# Patient Record
Sex: MALE | Race: WHITE | NOT HISPANIC OR LATINO | Employment: OTHER | RURAL
[De-identification: names, ages, dates, MRNs, and addresses within clinical notes are randomized per-mention and may not be internally consistent; named-entity substitution may affect disease eponyms.]

---

## 2017-03-09 ENCOUNTER — OFFICE VISIT (OUTPATIENT)
Dept: FAMILY MEDICINE CLINIC | Facility: CLINIC | Age: 47
End: 2017-03-09

## 2017-03-09 VITALS
BODY MASS INDEX: 45.1 KG/M2 | WEIGHT: 315 LBS | DIASTOLIC BLOOD PRESSURE: 75 MMHG | SYSTOLIC BLOOD PRESSURE: 164 MMHG | OXYGEN SATURATION: 98 % | TEMPERATURE: 97.3 F | HEART RATE: 82 BPM | HEIGHT: 70 IN

## 2017-03-09 DIAGNOSIS — Z13.1 ENCOUNTER FOR SCREENING FOR DIABETES MELLITUS: ICD-10-CM

## 2017-03-09 DIAGNOSIS — Z13.29 ENCOUNTER FOR SCREENING FOR ENDOCRINE DISORDER: ICD-10-CM

## 2017-03-09 DIAGNOSIS — M54.2 CHRONIC NECK PAIN: ICD-10-CM

## 2017-03-09 DIAGNOSIS — G89.29 CHRONIC PAIN OF BOTH KNEES: Primary | ICD-10-CM

## 2017-03-09 DIAGNOSIS — Z00.00 GENERAL MEDICAL EXAMINATION: ICD-10-CM

## 2017-03-09 DIAGNOSIS — M25.562 CHRONIC PAIN OF BOTH KNEES: Primary | ICD-10-CM

## 2017-03-09 DIAGNOSIS — M25.561 CHRONIC PAIN OF BOTH KNEES: Primary | ICD-10-CM

## 2017-03-09 DIAGNOSIS — Z72.0 TOBACCO USER: ICD-10-CM

## 2017-03-09 DIAGNOSIS — Z71.6 TOBACCO ABUSE COUNSELING: ICD-10-CM

## 2017-03-09 DIAGNOSIS — G89.29 CHRONIC NECK PAIN: ICD-10-CM

## 2017-03-09 DIAGNOSIS — Z13.6 ENCOUNTER FOR SCREENING FOR CARDIOVASCULAR DISORDERS: ICD-10-CM

## 2017-03-09 DIAGNOSIS — E66.01 MORBID OBESITY, UNSPECIFIED OBESITY TYPE (HCC): ICD-10-CM

## 2017-03-09 PROCEDURE — 99203 OFFICE O/P NEW LOW 30 MIN: CPT | Performed by: FAMILY MEDICINE

## 2017-03-09 RX ORDER — HYDROCHLOROTHIAZIDE 25 MG/1
12.5 TABLET ORAL DAILY
Qty: 30 TABLET | Refills: 11 | Status: SHIPPED | OUTPATIENT
Start: 2017-03-09

## 2017-03-09 RX ORDER — IBUPROFEN 800 MG/1
800 TABLET ORAL EVERY 6 HOURS PRN
Qty: 120 TABLET | Refills: 3 | Status: SHIPPED | OUTPATIENT
Start: 2017-03-09

## 2017-03-09 RX ORDER — LISINOPRIL 10 MG/1
10 TABLET ORAL DAILY
Qty: 30 TABLET | Refills: 11 | Status: SHIPPED | OUTPATIENT
Start: 2017-03-09

## 2017-03-09 NOTE — PATIENT INSTRUCTIONS
Lisinopril tablets  What is this medicine?  LISINOPRIL (lyse IN oh pril) is an ACE inhibitor. This medicine is used to treat high blood pressure and heart failure. It is also used to protect the heart immediately after a heart attack.  This medicine may be used for other purposes; ask your health care provider or pharmacist if you have questions.  COMMON BRAND NAME(S): Prinivil, Zestril  What should I tell my health care provider before I take this medicine?  They need to know if you have any of these conditions:  -diabetes  -heart or blood vessel disease  -kidney disease  -low blood pressure  -previous swelling of the tongue, face, or lips with difficulty breathing, difficulty swallowing, hoarseness, or tightening of the throat  -an unusual or allergic reaction to lisinopril, other ACE inhibitors, insect venom, foods, dyes, or preservatives  -pregnant or trying to get pregnant  -breast-feeding  How should I use this medicine?  Take this medicine by mouth with a glass of water. Follow the directions on your prescription label. You may take this medicine with or without food. If it upsets your stomach, take it with food. Take your medicine at regular intervals. Do not take it more often than directed. Do not stop taking except on your doctor's advice.  Talk to your pediatrician regarding the use of this medicine in children. Special care may be needed. While this drug may be prescribed for children as young as 6 years of age for selected conditions, precautions do apply.  Overdosage: If you think you have taken too much of this medicine contact a poison control center or emergency room at once.  NOTE: This medicine is only for you. Do not share this medicine with others.  What if I miss a dose?  If you miss a dose, take it as soon as you can. If it is almost time for your next dose, take only that dose. Do not take double or extra doses.  What may interact with this medicine?  Do not take this medicine with any of  the following medications:  -hymenoptera venomThis medicines may also interact with the following medications:  -aliskiren  -angiotensin receptor blockers, like losartan or valsartan  -certain medicines for diabetes  -diuretics  -everolimus  -gold compounds  -lithium  -NSAIDs, medicines for pain and inflammation, like ibuprofen or naproxen  -potassium salts or supplements  -salt substitutes  -sirolimus  -temsirolimus  This list may not describe all possible interactions. Give your health care provider a list of all the medicines, herbs, non-prescription drugs, or dietary supplements you use. Also tell them if you smoke, drink alcohol, or use illegal drugs. Some items may interact with your medicine.  What should I watch for while using this medicine?  Visit your doctor or health care professional for regular check ups. Check your blood pressure as directed. Ask your doctor what your blood pressure should be, and when you should contact him or her.  Do not treat yourself for coughs, colds, or pain while you are using this medicine without asking your doctor or health care professional for advice. Some ingredients may increase your blood pressure.  Women should inform their doctor if they wish to become pregnant or think they might be pregnant. There is a potential for serious side effects to an unborn child. Talk to your health care professional or pharmacist for more information.  Check with your doctor or health care professional if you get an attack of severe diarrhea, nausea and vomiting, or if you sweat a lot. The loss of too much body fluid can make it dangerous for you to take this medicine.  You may get drowsy or dizzy. Do not drive, use machinery, or do anything that needs mental alertness until you know how this drug affects you. Do not stand or sit up quickly, especially if you are an older patient. This reduces the risk of dizzy or fainting spells. Alcohol can make you more drowsy and dizzy. Avoid  alcoholic drinks.  Avoid salt substitutes unless you are told otherwise by your doctor or health care professional.  What side effects may I notice from receiving this medicine?  Side effects that you should report to your doctor or health care professional as soon as possible:  -allergic reactions like skin rash, itching or hives, swelling of the hands, feet, face, lips, throat, or tongue  -breathing problems  -signs and symptoms of kidney injury like trouble passing urine or change in the amount of urine  -signs and symptoms of increased potassium like muscle weakness; chest pain; or fast, irregular heartbeat  -signs and symptoms of liver injury like dark yellow or brown urine; general ill feeling or flu-like symptoms; light-colored stools; loss of appetite; nausea; right upper belly pain; unusually weak or tired; yellowing of the eyes or skin  -signs and symptoms of low blood pressure like dizziness; feeling faint or lightheaded, falls; unusually weak or tired  -stomach pain with or without nausea and vomiting  Side effects that usually do not require medical attention (report to your doctor or health care professional if they continue or are bothersome):  -changes in taste  -cough  -dizziness  -fever  -headache  -sensitivity to light  This list may not describe all possible side effects. Call your doctor for medical advice about side effects. You may report side effects to FDA at 6-273-FDA-1686.  Where should I keep my medicine?  Keep out of the reach of children.  Store at room temperature between 15 and 30 degrees C (59 and 86 degrees F). Protect from moisture. Keep container tightly closed. Throw away any unused medicine after the expiration date.  NOTE: This sheet is a summary. It may not cover all possible information. If you have questions about this medicine, talk to your doctor, pharmacist, or health care provider.     © 2016, [employer] Standard. (2016-08-11 20:38:20)  Hydrochlorothiazide, HCTZ;  Lisinopril tablets  What is this medicine?  HYDROCHLOROTHIAZIDE; LISINOPRIL (anne droe klor oh THYE a zide; lyse IN oh pril) is a combination of a diuretic and an ACE inhibitor. It is used to treat high blood pressure.  This medicine may be used for other purposes; ask your health care provider or pharmacist if you have questions.  COMMON BRAND NAME(S): Prinzide, Zestoretic  What should I tell my health care provider before I take this medicine?  They need to know if you have any of these conditions:  -bone marrow disease  -decreased urine  -heart or blood vessel disease  -if you are on a special diet like a low salt diet  -immune system problems, like lupus  -kidney disease  -liver disease  -previous swelling of the tongue, face, or lips with difficulty breathing, difficulty swallowing, hoarseness, or tightening of the throat  -recent heart attack or stroke  -an unusual or allergic reaction to lisinopril, hydrochlorothiazide, sulfa drugs, other medicines, insect venom, foods, dyes, or preservatives  -pregnant or trying to get pregnant  -breast-feeding  How should I use this medicine?  Take this medicine by mouth with a glass of water. Follow the directions on the prescription label. You can take it with or without food. If it upsets your stomach, take it with food. Take your medicine at regular intervals. Do not take it more often than directed. Do not stop taking except on your doctor's advice.  Talk to your pediatrician regarding the use of this medicine in children. Special care may be needed.  Overdosage: If you think you have taken too much of this medicine contact a poison control center or emergency room at once.  NOTE: This medicine is only for you. Do not share this medicine with others.  What if I miss a dose?  If you miss a dose, take it as soon as you can. If it is almost time for your next dose, take only that dose. Do not take double or extra doses.  What may interact with this medicine?  -barbiturates  like phenobarbital  -blood pressure medicines  -corticosteroids like prednisone  -diabetic medications  -diuretics, especially triamterene, spironolactone or amiloride  -lithium  -NSAIDs like ibuprofen  -potassium salts or potassium supplements  -prescription pain medicines  -skeletal muscle relaxants like tubocurarine  -some cholesterol lowering medications like cholestyramine or colestipol  This list may not describe all possible interactions. Give your health care provider a list of all the medicines, herbs, non-prescription drugs, or dietary supplements you use. Also tell them if you smoke, drink alcohol, or use illegal drugs. Some items may interact with your medicine.  What should I watch for while using this medicine?  Visit your doctor or health care professional for regular checks on your progress. Check your blood pressure as directed. Ask your doctor or health care professional what your blood pressure should be and when you should contact him or her. Call your doctor or health care professional if you notice an irregular or fast heart beat.  You must not get dehydrated. Ask your doctor or health care professional how much fluid you need to drink a day. Check with him or her if you get an attack of severe diarrhea, nausea and vomiting, or if you sweat a lot. The loss of too much body fluid can make it dangerous for you to take this medicine.  Women should inform their doctor if they wish to become pregnant or think they might be pregnant. There is a potential for serious side effects to an unborn child. Talk to your health care professional or pharmacist for more information.  You may get drowsy or dizzy. Do not drive, use machinery, or do anything that needs mental alertness until you know how this drug affects you. Do not stand or sit up quickly, especially if you are an older patient. This reduces the risk of dizzy or fainting spells. Alcohol can make you more drowsy and dizzy. Avoid alcoholic  drinks.  This medicine may affect your blood sugar level. If you have diabetes, check with your doctor or health care professional before changing the dose of your diabetic medicine.  Avoid salt substitutes unless you are told otherwise by your doctor or health care professional.  This medicine can make you more sensitive to the sun. Keep out of the sun. If you cannot avoid being in the sun, wear protective clothing and use sunscreen. Do not use sun lamps or tanning beds/booths.  Do not treat yourself for coughs, colds, or pain while you are taking this medicine without asking your doctor or health care professional for advice. Some ingredients may increase your blood pressure.  What side effects may I notice from receiving this medicine?  Side effects that you should report to your doctor or health care professional as soon as possible:  -changes in vision  -confusion, dizziness, light headedness or fainting spells  -decreased amount of urine passed  -difficulty breathing or swallowing, hoarseness, or tightening of the throat  -eye pain  -fast or irregular heart beat, palpitations, or chest pain  -muscle cramps  -nausea and vomiting  -persistent dry cough  -redness, blistering, peeling or loosening of the skin, including inside the mouth  -stomach pain  -swelling of your face, lips, tongue, hands, or feet  -unusual rash, bleeding or bruising, or pinpoint red spots on the skin  -worsened gout pain  -yellowing of the eyes or skin  Side effects that usually do not require medical attention (report to your doctor or health care professional if they continue or are bothersome):  -change in sex drive or performance  -cough  -headache  This list may not describe all possible side effects. Call your doctor for medical advice about side effects. You may report side effects to FDA at 7-715-FDA-8048.  Where should I keep my medicine?  Keep out of the reach of children.  Store at room temperature between 20 and 25 degrees C  "(68 and 77 degrees F). Protect from moisture and excessive light. Keep container tightly closed. Throw away any unused medicine after the expiration date.  NOTE: This sheet is a summary. It may not cover all possible information. If you have questions about this medicine, talk to your doctor, pharmacist, or health care provider.     © 2016, [employer]vier/Gold Standard. (2011-09-07 13:33:52)  DASH Eating Plan  DASH stands for \"Dietary Approaches to Stop Hypertension.\" The DASH eating plan is a healthy eating plan that has been shown to reduce high blood pressure (hypertension). Additional health benefits may include reducing the risk of type 2 diabetes mellitus, heart disease, and stroke. The DASH eating plan may also help with weight loss.  WHAT DO I NEED TO KNOW ABOUT THE DASH EATING PLAN?  For the DASH eating plan, you will follow these general guidelines:  · Choose foods with a percent daily value for sodium of less than 5% (as listed on the food label).  · Use salt-free seasonings or herbs instead of table salt or sea salt.  · Check with your health care provider or pharmacist before using salt substitutes.  · Eat lower-sodium products, often labeled as \"lower sodium\" or \"no salt added.\"  · Eat fresh foods.  · Eat more vegetables, fruits, and low-fat dairy products.  · Choose whole grains. Look for the word \"whole\" as the first word in the ingredient list.  · Choose fish and skinless chicken or turkey more often than red meat. Limit fish, poultry, and meat to 6 oz (170 g) each day.  · Limit sweets, desserts, sugars, and sugary drinks.  · Choose heart-healthy fats.  · Limit cheese to 1 oz (28 g) per day.  · Eat more home-cooked food and less restaurant, buffet, and fast food.  · Limit fried foods.  · Cook foods using methods other than frying.  · Limit canned vegetables. If you do use them, rinse them well to decrease the sodium.  · When eating at a restaurant, ask that your food be prepared with less salt, or no salt " if possible.  WHAT FOODS CAN I EAT?  Seek help from a dietitian for individual calorie needs.  Grains  Whole grain or whole wheat bread. Brown rice. Whole grain or whole wheat pasta. Quinoa, bulgur, and whole grain cereals. Low-sodium cereals. Corn or whole wheat flour tortillas. Whole grain cornbread. Whole grain crackers. Low-sodium crackers.  Vegetables  Fresh or frozen vegetables (raw, steamed, roasted, or grilled). Low-sodium or reduced-sodium tomato and vegetable juices. Low-sodium or reduced-sodium tomato sauce and paste. Low-sodium or reduced-sodium canned vegetables.   Fruits  All fresh, canned (in natural juice), or frozen fruits.  Meat and Other Protein Products  Ground beef (85% or leaner), grass-fed beef, or beef trimmed of fat. Skinless chicken or turkey. Ground chicken or turkey. Pork trimmed of fat. All fish and seafood. Eggs. Dried beans, peas, or lentils. Unsalted nuts and seeds. Unsalted canned beans.  Dairy  Low-fat dairy products, such as skim or 1% milk, 2% or reduced-fat cheeses, low-fat ricotta or cottage cheese, or plain low-fat yogurt. Low-sodium or reduced-sodium cheeses.  Fats and Oils  Tub margarines without trans fats. Light or reduced-fat mayonnaise and salad dressings (reduced sodium). Avocado. Safflower, olive, or canola oils. Natural peanut or almond butter.  Other  Unsalted popcorn and pretzels.  The items listed above may not be a complete list of recommended foods or beverages. Contact your dietitian for more options.  WHAT FOODS ARE NOT RECOMMENDED?  Grains  White bread. White pasta. White rice. Refined cornbread. Bagels and croissants. Crackers that contain trans fat.  Vegetables  Creamed or fried vegetables. Vegetables in a cheese sauce. Regular canned vegetables. Regular canned tomato sauce and paste. Regular tomato and vegetable juices.  Fruits  Dried fruits. Canned fruit in light or heavy syrup. Fruit juice.  Meat and Other Protein Products  Fatty cuts of meat. Ribs,  chicken wings, aldridge, sausage, bologna, salami, chitterlings, fatback, hot dogs, bratwurst, and packaged luncheon meats. Salted nuts and seeds. Canned beans with salt.  Dairy  Whole or 2% milk, cream, half-and-half, and cream cheese. Whole-fat or sweetened yogurt. Full-fat cheeses or blue cheese. Nondairy creamers and whipped toppings. Processed cheese, cheese spreads, or cheese curds.  Condiments  Onion and garlic salt, seasoned salt, table salt, and sea salt. Canned and packaged gravies. Worcestershire sauce. Tartar sauce. Barbecue sauce. Teriyaki sauce. Soy sauce, including reduced sodium. Steak sauce. Fish sauce. Oyster sauce. Cocktail sauce. Horseradish. Ketchup and mustard. Meat flavorings and tenderizers. Bouillon cubes. Hot sauce. Tabasco sauce. Marinades. Taco seasonings. Relishes.  Fats and Oils  Butter, stick margarine, lard, shortening, ghee, and aldridge fat. Coconut, palm kernel, or palm oils. Regular salad dressings.  Other  Pickles and olives. Salted popcorn and pretzels.  The items listed above may not be a complete list of foods and beverages to avoid. Contact your dietitian for more information.  WHERE CAN I FIND MORE INFORMATION?  [employer] [place]: www.nhlbi.nih.gov/health/health-topics/topics/dash/     This information is not intended to replace advice given to you by your health care provider. Make sure you discuss any questions you have with your health care provider.     Document Released: 12/06/2012 Document Revised: 01/08/2016 Document Reviewed: 10/22/2014  Elsevier Interactive Patient Education ©2016 [employer]

## 2017-03-09 NOTE — PROGRESS NOTES
Subjective   Delmar Rodriges is a 46 y.o. male.     Problem List  1. Morbid obesity  2. Rheumatoid arthriits  3. Neck pain  4. Bilateral knee pain  5.  Tobacco smoker   6. Obstructive sleep apnea  7. Essential Hypertension    Pt is 45 yo WM who I am seeing for the first time. Is here to establish.  Recently moved back her from [place].  Records are not here currently.  Has history of RA and bilateral knee pain.  Was diagnosed with RA many years ago but was not seeing a Rheumatologist.  Also has seen Orthopedic Surgeon in [place] and I am told pt was not a candidate for surgery.  Pt is a former  and is currently on disability for his chronic pain issues.  Has history of morbid obesity and also smokes 1 ppd for many years. Wants to quit but is not willing to try nicotine patches.  Is also due for new labwork.  Also has been having neck pain for past 1 month now.  Denies any trauma but pt attributes pain to his previous job as . States Ibuprofen helps with pain in both knees and neck     Also BP is elevated today. Has history of hypertension and was on BP medication but not currently.  Denies any chest pain headaches or dizziness.  Does not have BP machine today       Neck Pain    This is a recurrent problem. The current episode started more than 1 month ago. The problem occurs daily. The problem has been unchanged. The pain is associated with nothing. The pain is present in the left side and right side. The quality of the pain is described as aching. The pain is at a severity of 5/10. The pain is moderate. The pain is same all the time. Stiffness is present all day. Associated symptoms include numbness and tingling. Pertinent negatives include no chest pain, fever, headaches, leg pain, pain with swallowing, paresis, photophobia, syncope, trouble swallowing, visual change, weakness or weight loss. He has tried NSAIDs for the symptoms. The treatment provided mild relief.   Knee Pain    The incident  "occurred more than 1 week ago. The incident occurred at home. There was no injury mechanism. The pain is present in the left knee and right knee. The quality of the pain is described as aching. The pain is at a severity of 7/10. The pain is moderate. Associated symptoms include an inability to bear weight, a loss of motion, a loss of sensation, muscle weakness, numbness and tingling. He reports no foreign bodies present. Nothing aggravates the symptoms. He has tried NSAIDs for the symptoms. The treatment provided mild relief.          The following portions of the patient's history were reviewed and updated as appropriate: allergies, current medications, past family history, past medical history, past social history, past surgical history and problem list.    Review of Systems   Constitutional: Negative for fever and weight loss.   HENT: Negative for trouble swallowing.    Eyes: Negative for photophobia.   Cardiovascular: Negative for chest pain and syncope.   Musculoskeletal: Positive for neck pain.   Neurological: Positive for tingling and numbness. Negative for weakness and headaches.       Objective    Visit Vitals   • /75 (BP Location: Left arm, Patient Position: Sitting, Cuff Size: Adult)   • Pulse 82   • Temp 97.3 °F (36.3 °C) (Axillary)   • Ht 70\" (177.8 cm)   • Wt (!) 369 lb 9.6 oz (168 kg)   • SpO2 98%   • BMI 53.03 kg/m2       Physical Exam    Assessment/Plan   Problems Addressed this Visit        Digestive    Morbid obesity    Relevant Orders    CBC Auto Differential    Comprehensive Metabolic Panel    Lipid Panel       Nervous and Auditory    Chronic neck pain    Relevant Orders    XR Spine Cervical Complete 4 or 5 View       Other    Chronic pain of both knees - Primary    Relevant Orders    XR Knee 3 View Bilateral    General medical examination    Relevant Orders    TSH    Hemoglobin A1c    Encounter for screening for diabetes mellitus    Encounter for screening for endocrine disorder    " Tobacco user    Encounter for screening for cardiovascular disorders    Tobacco abuse counseling      - for tobacco abuse counseling -recommended nicotine patch - patient declined. Counseled pt >5 minutes  - chronic pain in both knees-  Ibuprofen 800 mg PO q 6hrs PRN - bilateral knee x-ray.  Possible referral to Orthopedic surgery  - moribid obesity - possible candidate for lap band surgery.  Will get labs including insulin, TSH.  Counseled to limit fast food intake.  Recommend high healthy fat low sugar and low carb diet  - recheck in 1 month  - discuss immunizations on next visit  - also discuss about possible PT/OT  -  Chronic neck pain - ibuprofen 800 mg q 6hrs PRN.  Cervical neck x-ray

## 2017-03-10 LAB
ALBUMIN SERPL-MCNC: 4.4 G/DL (ref 3.4–4.8)
ALBUMIN/GLOB SERPL: 1.3 G/DL (ref 1.1–1.8)
ALP SERPL-CCNC: 74 U/L (ref 38–126)
ALT SERPL W P-5'-P-CCNC: 41 U/L (ref 21–72)
ANION GAP SERPL CALCULATED.3IONS-SCNC: 11 MMOL/L (ref 5–15)
ARTICHOKE IGE QN: 100 MG/DL (ref 1–129)
AST SERPL-CCNC: 26 U/L (ref 17–59)
BASOPHILS # BLD AUTO: 0.03 10*3/MM3 (ref 0–0.2)
BASOPHILS NFR BLD AUTO: 0.4 % (ref 0–2)
BILIRUB SERPL-MCNC: 0.9 MG/DL (ref 0.2–1.3)
BUN BLD-MCNC: 19 MG/DL (ref 7–21)
BUN/CREAT SERPL: 25.3 (ref 7–25)
CALCIUM SPEC-SCNC: 9.5 MG/DL (ref 8.4–10.2)
CHLORIDE SERPL-SCNC: 102 MMOL/L (ref 95–110)
CHOLEST SERPL-MCNC: 180 MG/DL (ref 0–199)
CO2 SERPL-SCNC: 26 MMOL/L (ref 22–31)
CREAT BLD-MCNC: 0.75 MG/DL (ref 0.7–1.3)
DEPRECATED RDW RBC AUTO: 48.3 FL (ref 35.1–43.9)
EOSINOPHIL # BLD AUTO: 0.29 10*3/MM3 (ref 0–0.7)
EOSINOPHIL NFR BLD AUTO: 3.5 % (ref 0–7)
ERYTHROCYTE [DISTWIDTH] IN BLOOD BY AUTOMATED COUNT: 14.5 % (ref 11.5–14.5)
GFR SERPL CREATININE-BSD FRML MDRD: 112 ML/MIN/1.73 (ref 63–147)
GLOBULIN UR ELPH-MCNC: 3.3 GM/DL (ref 2.3–3.5)
GLUCOSE BLD-MCNC: 92 MG/DL (ref 60–100)
HBA1C MFR BLD: 5.2 % (ref 4–5.6)
HCT VFR BLD AUTO: 46.6 % (ref 39–49)
HDLC SERPL-MCNC: 28 MG/DL (ref 60–200)
HGB BLD-MCNC: 15.9 G/DL (ref 13.7–17.3)
IMM GRANULOCYTES # BLD: 0.05 10*3/MM3 (ref 0–0.02)
IMM GRANULOCYTES NFR BLD: 0.6 % (ref 0–0.5)
LDLC/HDLC SERPL: 3.59 {RATIO} (ref 0–3.55)
LYMPHOCYTES # BLD AUTO: 2.23 10*3/MM3 (ref 0.6–4.2)
LYMPHOCYTES NFR BLD AUTO: 27.3 % (ref 10–50)
MCH RBC QN AUTO: 30.8 PG (ref 26.5–34)
MCHC RBC AUTO-ENTMCNC: 34.1 G/DL (ref 31.5–36.3)
MCV RBC AUTO: 90.1 FL (ref 80–98)
MONOCYTES # BLD AUTO: 0.78 10*3/MM3 (ref 0–0.9)
MONOCYTES NFR BLD AUTO: 9.5 % (ref 0–12)
NEUTROPHILS # BLD AUTO: 4.79 10*3/MM3 (ref 2–8.6)
NEUTROPHILS NFR BLD AUTO: 58.7 % (ref 37–80)
PLAT MORPH BLD: NORMAL
PLATELET # BLD AUTO: 223 10*3/MM3 (ref 150–450)
PMV BLD AUTO: 11.5 FL (ref 8–12)
POTASSIUM BLD-SCNC: 4.8 MMOL/L (ref 3.5–5.1)
PROT SERPL-MCNC: 7.7 G/DL (ref 6.3–8.6)
RBC # BLD AUTO: 5.17 10*6/MM3 (ref 4.37–5.74)
RBC MORPH BLD: NORMAL
SODIUM BLD-SCNC: 139 MMOL/L (ref 137–145)
TRIGL SERPL-MCNC: 258 MG/DL (ref 20–199)
TSH SERPL DL<=0.05 MIU/L-ACNC: 1.8 MIU/ML (ref 0.46–4.68)
WBC MORPH BLD: NORMAL
WBC NRBC COR # BLD: 8.17 10*3/MM3 (ref 3.2–9.8)

## 2017-03-10 PROCEDURE — 83036 HEMOGLOBIN GLYCOSYLATED A1C: CPT | Performed by: FAMILY MEDICINE

## 2017-03-10 PROCEDURE — 85025 COMPLETE CBC W/AUTO DIFF WBC: CPT | Performed by: FAMILY MEDICINE

## 2017-03-10 PROCEDURE — 80061 LIPID PANEL: CPT | Performed by: FAMILY MEDICINE

## 2017-03-10 PROCEDURE — 84443 ASSAY THYROID STIM HORMONE: CPT | Performed by: FAMILY MEDICINE

## 2017-03-10 PROCEDURE — 80053 COMPREHEN METABOLIC PANEL: CPT | Performed by: FAMILY MEDICINE

## 2017-03-10 PROCEDURE — 85007 BL SMEAR W/DIFF WBC COUNT: CPT | Performed by: FAMILY MEDICINE

## 2017-04-10 ENCOUNTER — OFFICE VISIT (OUTPATIENT)
Dept: FAMILY MEDICINE CLINIC | Facility: CLINIC | Age: 47
End: 2017-04-10

## 2017-04-10 VITALS
WEIGHT: 315 LBS | RESPIRATION RATE: 16 BRPM | DIASTOLIC BLOOD PRESSURE: 72 MMHG | HEIGHT: 70 IN | TEMPERATURE: 98.6 F | BODY MASS INDEX: 45.1 KG/M2 | SYSTOLIC BLOOD PRESSURE: 120 MMHG | HEART RATE: 64 BPM

## 2017-04-10 DIAGNOSIS — Z23 NEED FOR VACCINATION: ICD-10-CM

## 2017-04-10 DIAGNOSIS — M25.562 CHRONIC PAIN OF BOTH KNEES: ICD-10-CM

## 2017-04-10 DIAGNOSIS — G89.29 CHRONIC NECK PAIN: Primary | ICD-10-CM

## 2017-04-10 DIAGNOSIS — E78.5 HYPERLIPIDEMIA, UNSPECIFIED HYPERLIPIDEMIA TYPE: ICD-10-CM

## 2017-04-10 DIAGNOSIS — M25.561 CHRONIC PAIN OF BOTH KNEES: ICD-10-CM

## 2017-04-10 DIAGNOSIS — M54.2 CHRONIC NECK PAIN: Primary | ICD-10-CM

## 2017-04-10 DIAGNOSIS — I10 ESSENTIAL HYPERTENSION: ICD-10-CM

## 2017-04-10 DIAGNOSIS — G89.29 CHRONIC PAIN OF BOTH KNEES: ICD-10-CM

## 2017-04-10 DIAGNOSIS — E66.01 MORBID OBESITY, UNSPECIFIED OBESITY TYPE (HCC): ICD-10-CM

## 2017-04-10 LAB
CRP SERPL-MCNC: 1.3 MG/DL (ref 0–1)
ERYTHROCYTE [SEDIMENTATION RATE] IN BLOOD: 25 MM/HR (ref 0–15)
URATE SERPL-MCNC: 7.9 MG/DL (ref 2.5–8.5)

## 2017-04-10 PROCEDURE — 84550 ASSAY OF BLOOD/URIC ACID: CPT | Performed by: FAMILY MEDICINE

## 2017-04-10 PROCEDURE — 86431 RHEUMATOID FACTOR QUANT: CPT | Performed by: FAMILY MEDICINE

## 2017-04-10 PROCEDURE — 85651 RBC SED RATE NONAUTOMATED: CPT | Performed by: FAMILY MEDICINE

## 2017-04-10 PROCEDURE — G0009 ADMIN PNEUMOCOCCAL VACCINE: HCPCS | Performed by: FAMILY MEDICINE

## 2017-04-10 PROCEDURE — 90472 IMMUNIZATION ADMIN EACH ADD: CPT | Performed by: FAMILY MEDICINE

## 2017-04-10 PROCEDURE — 86200 CCP ANTIBODY: CPT | Performed by: FAMILY MEDICINE

## 2017-04-10 PROCEDURE — 86060 ANTISTREPTOLYSIN O TITER: CPT | Performed by: FAMILY MEDICINE

## 2017-04-10 PROCEDURE — 90732 PPSV23 VACC 2 YRS+ SUBQ/IM: CPT | Performed by: FAMILY MEDICINE

## 2017-04-10 PROCEDURE — 90715 TDAP VACCINE 7 YRS/> IM: CPT | Performed by: FAMILY MEDICINE

## 2017-04-10 PROCEDURE — 99214 OFFICE O/P EST MOD 30 MIN: CPT | Performed by: FAMILY MEDICINE

## 2017-04-10 PROCEDURE — 86038 ANTINUCLEAR ANTIBODIES: CPT | Performed by: FAMILY MEDICINE

## 2017-04-10 PROCEDURE — 86140 C-REACTIVE PROTEIN: CPT | Performed by: FAMILY MEDICINE

## 2017-04-10 PROCEDURE — 86063 ANTISTREPTOLYSIN O SCREEN: CPT | Performed by: FAMILY MEDICINE

## 2017-04-10 NOTE — PATIENT INSTRUCTIONS

## 2017-04-10 NOTE — PROGRESS NOTES
Subjective   Delmar Rodriges is a 46 y.o. male.     Problem List  1. Morbid obesity  2. Rheumatoid arthriits  3. Neck pain  4. Bilateral knee pain  5. Former Tobacco smoker   6. Obstructive sleep apnea  7. Essential Hypertension  8. Hyperlipidemia    Pt is 45 yo WM with the above medical issues. He is here for recheck. Pt cannot afford medications at this time due to lack of insurance. States his insurance won't be active until after May 1st. Pt cannot afford medications for blood pressure and for hyperlipidemia. Will get paychecks after May 1st   ASCVD risk elevated and statin medication is indicated.  Pt has quit smoking 3 weeks ago. Hga1c and TSH were normal.  Pt also has history of RA and bilateral knee pain along with neck pain but records are not currently here from previous PCP.  Pt's knees continue to be swollen and tender.  Did not get x-rays of neck and knees from last visit.  Pt states he has not seen Rheumatologist in past and was told he did have RA      Joint Swelling   This is a chronic problem. The current episode started more than 1 year ago. The problem occurs daily. The problem has been unchanged. Associated symptoms include arthralgias, joint swelling and numbness. Pertinent negatives include no abdominal pain, anorexia, change in bowel habit, chest pain, chills, congestion, coughing, fatigue, fever, headaches, myalgias, nausea, neck pain, rash, sore throat, swollen glands, urinary symptoms, vertigo, visual change, vomiting or weakness. The symptoms are aggravated by standing. He has tried nothing for the symptoms. The treatment provided no relief.   Knee Pain    The incident occurred more than 1 week ago. The incident occurred at home. The pain is present in the left knee and right knee. The quality of the pain is described as aching. The pain is at a severity of 7/10. The pain is moderate. The pain has been fluctuating since onset. Associated symptoms include a loss of motion, a loss of sensation,  "muscle weakness, numbness and tingling. Pertinent negatives include no inability to bear weight. He reports no foreign bodies present. The symptoms are aggravated by movement, palpation and weight bearing. The treatment provided no relief.          The following portions of the patient's history were reviewed and updated as appropriate: allergies, current medications, past family history, past medical history, past social history, past surgical history and problem list.    Review of Systems   Constitutional: Negative.  Negative for chills, fatigue and fever.   HENT: Negative.  Negative for congestion and sore throat.    Eyes: Negative.    Respiratory: Negative.  Negative for cough.    Cardiovascular: Negative.  Negative for chest pain.   Gastrointestinal: Negative.  Negative for abdominal pain, anorexia, change in bowel habit, nausea and vomiting.   Endocrine: Negative.    Genitourinary: Negative.    Musculoskeletal: Positive for arthralgias and joint swelling. Negative for myalgias and neck pain.   Skin: Negative.  Negative for rash.   Allergic/Immunologic: Negative.    Neurological: Positive for tingling and numbness. Negative for vertigo, weakness and headaches.   Hematological: Negative.    Psychiatric/Behavioral: Negative.        Objective    /72  Pulse 64  Temp 98.6 °F (37 °C)  Resp 16  Ht 70\" (177.8 cm)  Wt (!) 375 lb 6.4 oz (170 kg)  BMI 53.86 kg/m2      Chemistry        Component Value Date/Time     03/10/2017 1021    K 4.8 03/10/2017 1021     03/10/2017 1021    CO2 26.0 03/10/2017 1021    BUN 19 03/10/2017 1021    CREATININE 0.75 03/10/2017 1021        Component Value Date/Time    CALCIUM 9.5 03/10/2017 1021    ALKPHOS 74 03/10/2017 1021    AST 26 03/10/2017 1021    ALT 41 03/10/2017 1021    BILITOT 0.9 03/10/2017 1021        Lab Results   Component Value Date    WBC 8.17 03/10/2017    HGB 15.9 03/10/2017    HCT 46.6 03/10/2017    MCV 90.1 03/10/2017     03/10/2017     Lab " Results   Component Value Date    CHOL 180 03/10/2017     Lab Results   Component Value Date    TRIG 258 (H) 03/10/2017     Lab Results   Component Value Date    HDL 28 (L) 03/10/2017     No results found for: LDLCALC  No results found for: LDL  No results found for: HDLLDLRATIO  No components found for: CHOLHDL  Lab Results   Component Value Date    HGBA1C 5.20 03/10/2017     Lab Results   Component Value Date    TSH 1.800 03/10/2017     Physical Exam   Constitutional: He is oriented to person, place, and time. He appears well-developed and well-nourished. No distress.   HENT:   Head: Normocephalic and atraumatic.   Right Ear: External ear normal.   Left Ear: External ear normal.   Eyes: Conjunctivae and EOM are normal. Pupils are equal, round, and reactive to light. Right eye exhibits no discharge. Left eye exhibits no discharge. No scleral icterus.   Neck: Normal range of motion. Neck supple. No JVD present. No tracheal deviation present. No thyromegaly present.   Cardiovascular: Normal rate, regular rhythm and normal heart sounds.    Pulmonary/Chest: Effort normal and breath sounds normal. No stridor. No respiratory distress. He has no wheezes.   Abdominal: Soft. Bowel sounds are normal. He exhibits no distension and no mass. There is no tenderness. There is no rebound and no guarding. No hernia.   Obese abdomen    Musculoskeletal: He exhibits tenderness. He exhibits no edema or deformity.        Right knee: He exhibits decreased range of motion, swelling and effusion. He exhibits no erythema. Tenderness found. Medial joint line and lateral joint line tenderness noted.        Left knee: He exhibits decreased range of motion, swelling and effusion. He exhibits no erythema. Tenderness found. Medial joint line tenderness noted.   Lymphadenopathy:     He has no cervical adenopathy.   Neurological: He is alert and oriented to person, place, and time. He has normal reflexes. No cranial nerve deficit. Coordination  normal.   Skin: Skin is warm and dry. No rash noted. He is not diaphoretic. No erythema. No pallor.   Psychiatric: He has a normal mood and affect. His behavior is normal. Judgment and thought content normal.   Nursing note and vitals reviewed.      Assessment/Plan   Problems Addressed this Visit        Cardiovascular and Mediastinum    Hyperlipidemia    Essential hypertension       Digestive    Morbid obesity       Nervous and Auditory    Chronic neck pain - Primary    Relevant Orders    XR Spine Cervical Complete 4 or 5 View       Other    Chronic pain of both knees    Relevant Orders    Uric acid    Antistreptolysin O screen    Rheumatoid factor    C-reactive protein    ROSALES    Sedimentation Rate    Cyclic Citrul Peptide Antibody, IgG / IgA    XR knee 4+ vw bilateral    Need for vaccination    Relevant Orders    Tdap Vaccine Greater Than or Equal To 6yo IM (Completed)    Pneumococcal Polysaccharide Vaccine 23-Valent Greater Than or Equal To 3yo Subcutaneous / IM (Completed)        - Pt will get x-rays of both knees since he missed it on last visit.  Will get rheumatoid panel, along with ESR and CRP. RF and Anti - CCP. Consider Rheumatology referral  - Since pt has hyperlipidemia and ASCVD risk high. Pt cannot afford medication for hypertension and hyperlipidemia at this time. Will wait until insurance is active in May  - will get pneumonia and tetanus vaccination today  - recheck in 1 month   - counseled on diet and exercise.  Gave educational material on DASH diet to go home with. Consider bariatric surgery if pt is willing to do that   - will reorder bilateral knee x-ray as well as x-ray of cervical neck

## 2017-04-11 LAB
ANA SER QL: NEGATIVE
CCP IGA+IGG SERPL IA-ACNC: 8 UNITS (ref 0–19)

## 2017-04-14 LAB
ASO AB SERPL-ACNC: POSITIVE [IU]/ML
ASO AB TITR SER: ABNORMAL {TITER}
RHEUMATOID FACT SERPL-ACNC: NEGATIVE [IU]/ML

## 2017-04-19 ENCOUNTER — OFFICE VISIT (OUTPATIENT)
Dept: FAMILY MEDICINE CLINIC | Facility: CLINIC | Age: 47
End: 2017-04-19

## 2017-04-19 VITALS
SYSTOLIC BLOOD PRESSURE: 118 MMHG | HEIGHT: 70 IN | HEART RATE: 63 BPM | TEMPERATURE: 98.6 F | BODY MASS INDEX: 45.1 KG/M2 | RESPIRATION RATE: 16 BRPM | DIASTOLIC BLOOD PRESSURE: 66 MMHG | WEIGHT: 315 LBS

## 2017-04-19 DIAGNOSIS — M47.812 ARTHRITIS OF NECK: ICD-10-CM

## 2017-04-19 DIAGNOSIS — M17.0 PRIMARY OSTEOARTHRITIS OF BOTH KNEES: ICD-10-CM

## 2017-04-19 DIAGNOSIS — E66.01 MORBID OBESITY, UNSPECIFIED OBESITY TYPE (HCC): Primary | ICD-10-CM

## 2017-04-19 PROCEDURE — 99213 OFFICE O/P EST LOW 20 MIN: CPT | Performed by: FAMILY MEDICINE

## 2017-04-19 NOTE — PROGRESS NOTES
Subjective   Delmar Rodriges is a 46 y.o. male.     History of Present Illness     Problem List  1. Morbid obesity  2. Osteoarthritis of both knees.  Severe osteoarthritic changes of the left knee with moderate osteoarthritic changes in right knee.   3. Neck pain  4. Bilateral knee pain  5. Former Tobacco smoker   6. Obstructive sleep apnea  7. Essential Hypertension  8. Hyperlipidemia  9. Extensive arthritis changes of apophyseal joints at multiple levels of cervical spine     Pt is 47 yo WM with the above medical issues who is here for followup on x-rays and labwork.  Pt had rheumatoid workup and was negative for ROSALES and had low Anti-CCP.  RF was negative. Pt was told in past he has RA.  ESR and CRP are mildly elevated. Recent x-rays of knees showed severe osteoarthritic changes of the left knee with moderate osteoarthritic changes in right knee.  X-ray of spine showed extensive arthritis change of apophyseal joints at multiple levels of cervical spine.  Pt states pain in knees are severe but is awaiting for insurance to be activated in May. Has seen Orthopedic surgeon before in [place] and was told surgery was not recommended due to his age.  Pt would like to hold off seeing Orthopedic Surgeon for now or doing PT/OT     The following portions of the patient's history were reviewed and updated as appropriate: allergies, current medications, past family history, past medical history, past social history, past surgical history and problem list.    Review of Systems   Constitutional: Negative.    HENT: Negative.    Eyes: Negative.    Respiratory: Negative.    Cardiovascular: Negative.    Gastrointestinal: Negative.    Endocrine: Negative.    Genitourinary: Negative.    Musculoskeletal: Positive for arthralgias, back pain, gait problem, neck pain and neck stiffness.   Skin: Negative.    Allergic/Immunologic: Negative.    Hematological: Negative.    Psychiatric/Behavioral: Negative.        Objective    /66  Pulse 63  " Temp 98.6 °F (37 °C)  Resp 16  Ht 70\" (177.8 cm)  Wt (!) 375 lb 6.4 oz (170 kg)  BMI 53.86 kg/m2    Chemistry        Component Value Date/Time     03/10/2017 1021    K 4.8 03/10/2017 1021     03/10/2017 1021    CO2 26.0 03/10/2017 1021    BUN 19 03/10/2017 1021    CREATININE 0.75 03/10/2017 1021        Component Value Date/Time    CALCIUM 9.5 03/10/2017 1021    ALKPHOS 74 03/10/2017 1021    AST 26 03/10/2017 1021    ALT 41 03/10/2017 1021    BILITOT 0.9 03/10/2017 1021        Lab Results   Component Value Date    WBC 8.17 03/10/2017    HGB 15.9 03/10/2017    HCT 46.6 03/10/2017    MCV 90.1 03/10/2017     03/10/2017     Lab Results   Component Value Date    CHOL 180 03/10/2017     Lab Results   Component Value Date    TRIG 258 (H) 03/10/2017     Lab Results   Component Value Date    HDL 28 (L) 03/10/2017     No results found for: LDLCALC  No results found for: LDL  No results found for: HDLLDLRATIO  No components found for: CHOLHDL  Lab Results   Component Value Date    HGBA1C 5.20 03/10/2017     Lab Results   Component Value Date    TSH 1.800 03/10/2017     Physical Exam   Constitutional: He is oriented to person, place, and time. He appears well-developed and well-nourished. No distress.   HENT:   Head: Normocephalic and atraumatic.   Right Ear: External ear normal.   Left Ear: External ear normal.   Eyes: Conjunctivae and EOM are normal. Pupils are equal, round, and reactive to light. Right eye exhibits no discharge. Left eye exhibits no discharge. No scleral icterus.   Neck: Normal range of motion. Neck supple. No JVD present. No tracheal deviation present. No thyromegaly present.   Cardiovascular: Normal rate, regular rhythm and normal heart sounds.    Pulmonary/Chest: Effort normal and breath sounds normal. No stridor. No respiratory distress. He has no wheezes.   Abdominal: Soft. Bowel sounds are normal. He exhibits no distension and no mass. There is no tenderness. There is no rebound " and no guarding. No hernia.   Obese abdomen    Musculoskeletal: He exhibits tenderness. He exhibits no edema or deformity.        Right knee: He exhibits decreased range of motion and swelling. Tenderness found. Medial joint line and lateral joint line tenderness noted.        Left knee: He exhibits decreased range of motion and swelling. Tenderness found. Medial joint line and lateral joint line tenderness noted.   Lymphadenopathy:     He has no cervical adenopathy.   Neurological: He is alert and oriented to person, place, and time. He has normal reflexes. No cranial nerve deficit. Coordination normal.   Skin: Skin is warm and dry. No rash noted. He is not diaphoretic. No erythema. No pallor.   Psychiatric: He has a normal mood and affect. His behavior is normal. Judgment and thought content normal.   Nursing note and vitals reviewed.      Assessment/Plan   Problems Addressed this Visit        Digestive    Morbid obesity - Primary       Musculoskeletal and Integument    Arthritis of neck       Other    Primary osteoarthritis of both knees        - pt declines referral to Orthopedic Surgeon at this time.  Also declines PT/OT. Once insurance is activity pt will let me know and will make referral.    - will recheck in 3 months  - pt declines dietician for morbid obesity. Counseled on diet and exercise today.

## 2017-07-19 ENCOUNTER — OFFICE VISIT (OUTPATIENT)
Dept: FAMILY MEDICINE CLINIC | Facility: CLINIC | Age: 47
End: 2017-07-19

## 2017-07-19 VITALS
DIASTOLIC BLOOD PRESSURE: 66 MMHG | TEMPERATURE: 98.6 F | HEART RATE: 67 BPM | HEIGHT: 70 IN | BODY MASS INDEX: 45.1 KG/M2 | SYSTOLIC BLOOD PRESSURE: 130 MMHG | RESPIRATION RATE: 16 BRPM | WEIGHT: 315 LBS

## 2017-07-19 DIAGNOSIS — E78.5 HYPERLIPIDEMIA, UNSPECIFIED HYPERLIPIDEMIA TYPE: Primary | ICD-10-CM

## 2017-07-19 DIAGNOSIS — E66.01 MORBID OBESITY, UNSPECIFIED OBESITY TYPE (HCC): ICD-10-CM

## 2017-07-19 DIAGNOSIS — Z00.00 GENERAL MEDICAL EXAMINATION: ICD-10-CM

## 2017-07-19 DIAGNOSIS — I10 ESSENTIAL HYPERTENSION: ICD-10-CM

## 2017-07-19 PROCEDURE — 99213 OFFICE O/P EST LOW 20 MIN: CPT | Performed by: FAMILY MEDICINE

## 2017-07-19 NOTE — PROGRESS NOTES
"Subjective   Delmar Rodriges is a 46 y.o. male.     History of Present Illness     Problem List  1. Morbid obesity  2. Osteoarthritis of both knees.  Severe osteoarthritic changes of the left knee with moderate osteoarthritic changes in right knee.   3. Neck pain  4. Bilateral knee pain  5. Former Tobacco smoker   6. Obstructive sleep apnea  7. Essential Hypertension  8. Hyperlipidemia  9. Extensive arthritis changes of apophyseal joints at multiple levels of cervical spine     Pt is 47 yo WM with the above medical issues. Is here for recheck. States he is doing well. Has lost 10 lbs since last visit by diet and exercise.  Is due for recheck on lipid panel for hyperlipidemia along with CBC and MP.  Pt has quit smoking as well.    BMI >40. But pt has been eating healthier and working out more      The following portions of the patient's history were reviewed and updated as appropriate: allergies, current medications, past family history, past medical history, past social history, past surgical history and problem list.    Review of Systems   Constitutional: Negative.    HENT: Negative.    Eyes: Negative.    Respiratory: Negative.    Cardiovascular: Negative.    Gastrointestinal: Negative.    Endocrine: Negative.    Genitourinary: Negative.    Musculoskeletal: Positive for arthralgias.   Skin: Negative.    Allergic/Immunologic: Negative.    Neurological: Negative.    Hematological: Negative.    Psychiatric/Behavioral: Negative.        Objective    /66  Pulse 67  Temp 98.6 °F (37 °C)  Resp 16  Ht 70\" (177.8 cm)  Wt (!) 367 lb 12.8 oz (167 kg)  BMI 52.77 kg/m2      Chemistry        Component Value Date/Time     03/10/2017 1021    K 4.8 03/10/2017 1021     03/10/2017 1021    CO2 26.0 03/10/2017 1021    BUN 19 03/10/2017 1021    CREATININE 0.75 03/10/2017 1021        Component Value Date/Time    CALCIUM 9.5 03/10/2017 1021    ALKPHOS 74 03/10/2017 1021    AST 26 03/10/2017 1021    ALT 41 03/10/2017 " 1021    BILITOT 0.9 03/10/2017 1021        Lab Results   Component Value Date    WBC 8.17 03/10/2017    HGB 15.9 03/10/2017    HCT 46.6 03/10/2017    MCV 90.1 03/10/2017     03/10/2017     Lab Results   Component Value Date    CHOL 180 03/10/2017     Lab Results   Component Value Date    TRIG 258 (H) 03/10/2017     Lab Results   Component Value Date    HDL 28 (L) 03/10/2017     No results found for: LDLCALC  No results found for: LDL  No results found for: HDLLDLRATIO  No components found for: CHOLHDL  Lab Results   Component Value Date    HGBA1C 5.20 03/10/2017     Office Visit on 04/10/2017   Component Date Value Ref Range Status   • Uric Acid 04/10/2017 7.9  2.5 - 8.5 mg/dL Final   • ASO 04/10/2017 Positive* Negative Final   • Rheumatoid Factor Qualitative 04/10/2017 Negative  Negative Final   • C-Reactive Protein 04/10/2017 1.30* 0.00 - 1.00 mg/dL Final   • ROSALES Direct 04/10/2017 Negative  Negative Final   • Sed Rate 04/10/2017 25* 0 - 15 mm/hr Final   • CCP Antibodies IgG/IgA 04/10/2017 8  0 - 19 units Final                              Negative               <20                            Weak positive      20 - 39                            Moderate positive  40 - 59                            Strong positive        >59   • ASO Titer 04/10/2017 200 IU/mL* Negative Final       Physical Exam   Constitutional: He is oriented to person, place, and time. He appears well-developed and well-nourished. No distress.   HENT:   Head: Normocephalic and atraumatic.   Right Ear: External ear normal.   Left Ear: External ear normal.   Eyes: Conjunctivae and EOM are normal. Pupils are equal, round, and reactive to light. Right eye exhibits no discharge. Left eye exhibits no discharge. No scleral icterus.   Neck: Normal range of motion. Neck supple. No JVD present. No tracheal deviation present. No thyromegaly present.   Cardiovascular: Normal rate, regular rhythm and normal heart sounds.    Pulmonary/Chest: Effort  normal and breath sounds normal. No stridor. No respiratory distress. He has no wheezes.   Abdominal: Soft. Bowel sounds are normal. He exhibits no distension and no mass. There is no tenderness. There is no rebound and no guarding. No hernia.   Obese abdomen   Musculoskeletal: Normal range of motion. He exhibits no edema, tenderness or deformity.   Lymphadenopathy:     He has no cervical adenopathy.   Neurological: He is alert and oriented to person, place, and time. He has normal reflexes. No cranial nerve deficit. Coordination normal.   Skin: Skin is warm and dry. No rash noted. He is not diaphoretic. No erythema. No pallor.   Psychiatric: He has a normal mood and affect. His behavior is normal.   Nursing note and vitals reviewed.      Assessment/Plan   Problems Addressed this Visit        Cardiovascular and Mediastinum    Hyperlipidemia - Primary    Relevant Orders    Lipid Panel    Essential hypertension       Digestive    Morbid obesity       Other    General medical examination    Relevant Orders    CBC Auto Differential    Comprehensive Metabolic Panel        - Hyperlipidemia - recheck lipid panel  - Essential hypertension - BP at goal continue HCTZ  - morbid obesity - BMI >40. Continue with weight loss, diet and exercise  - CBC and CMP today  - recheck in 3 months   - gave prescription for CPAP machine and supplies today

## 2017-08-10 ENCOUNTER — OFFICE VISIT (OUTPATIENT)
Dept: FAMILY MEDICINE CLINIC | Facility: CLINIC | Age: 47
End: 2017-08-10

## 2017-08-10 VITALS
HEART RATE: 72 BPM | RESPIRATION RATE: 20 BRPM | BODY MASS INDEX: 45.1 KG/M2 | OXYGEN SATURATION: 97 % | WEIGHT: 315 LBS | DIASTOLIC BLOOD PRESSURE: 100 MMHG | SYSTOLIC BLOOD PRESSURE: 140 MMHG | HEIGHT: 70 IN | TEMPERATURE: 98.2 F

## 2017-08-10 DIAGNOSIS — Z00.00 MEDICARE ANNUAL WELLNESS VISIT, INITIAL: Primary | ICD-10-CM

## 2017-08-10 PROCEDURE — G0438 PPPS, INITIAL VISIT: HCPCS | Performed by: FAMILY MEDICINE

## 2017-08-10 NOTE — PROGRESS NOTES
QUICK REFERENCE INFORMATION:  The ABCs of the Annual Wellness Visit    Initial Medicare Wellness Visit    HEALTH RISK ASSESSMENT    1970    Recent Hospitalizations:  No hospitalization(s) within the last year..        Current Medical Providers:  Patient Care Team:  Abdullahi Paula MD as PCP - General (Family Medicine)        Smoking Status:  History   Smoking Status   • Current Some Day Smoker   Smokeless Tobacco   • Never Used       Alcohol Consumption:  History   Alcohol Use No       Depression Screen:   PHQ-9 Depression Screening 8/10/2017   Little interest or pleasure in doing things 1   Feeling down, depressed, or hopeless 3   Trouble falling or staying asleep, or sleeping too much 0   Feeling tired or having little energy 3   Poor appetite or overeating 0   Feeling bad about yourself - or that you are a failure or have let yourself or your family down 1   Trouble concentrating on things, such as reading the newspaper or watching television 0   Moving or speaking so slowly that other people could have noticed. Or the opposite - being so fidgety or restless that you have been moving around a lot more than usual 0   Thoughts that you would be better off dead, or of hurting yourself in some way 0   PHQ-9 Total Score 8   If you checked off any problems, how difficult have these problems made it for you to do your work, take care of things at home, or get along with other people? Not difficult at all       Health Habits and Functional and Cognitive Screening:  Functional & Cognitive Status 8/10/2017   Do you have difficulty preparing food and eating? No   Do you have difficulty bathing yourself? No   Do you have difficulty getting dressed? No   Do you have difficulty using the toilet? No   Do you have difficulty moving around from place to place? No   In the past year have you fallen or experienced a near fall? No   Do you need help using the phone?  No   Are you deaf or do you have serious difficulty hearing?   No   Do you need help with transportation? No   Do you need help shopping? No   Do you need help preparing meals?  No   Do you need help with housework?  No   Do you need help with laundry? No   Do you need help taking your medications? No   Do you need help managing money? No       Health Habits  Dental Exam: Not up to date  Eye Exam: Not up to date  Exercise (times per week): 6 times per week  Current Exercise Activities Include: Stationary Bicycling/Spin Class          Does the patient have evidence of cognitive impairment? No    Asiprin use counseling: Start ASA 81 mg daily       Recent Lab Results:    Visual Acuity:  No exam data present    Age-appropriate Screening Schedule:  Refer to the list below for future screening recommendations based on patient's age, sex and/or medical conditions. Orders for these recommended tests are listed in the plan section. The patient has been provided with a written plan.    Health Maintenance   Topic Date Due   • INFLUENZA VACCINE  09/01/2017   • LIPID PANEL  03/10/2018   • TDAP/TD VACCINES (2 - Td) 04/10/2027   • PNEUMOCOCCAL VACCINE (19-64 MEDIUM RISK)  Completed        Subjective   History of Present Illness    Delmar Rodriges is a 46 y.o. male who presents for an Annual Wellness Visit.    The following portions of the patient's history were reviewed and updated as appropriate: allergies, current medications, past family history, past medical history, past social history, past surgical history and problem list.    Outpatient Medications Prior to Visit   Medication Sig Dispense Refill   • aspirin 81 MG tablet Take 1 tablet by mouth Daily. 30 tablet 11   • hydrochlorothiazide (HYDRODIURIL) 25 MG tablet Take 0.5 tablets by mouth Daily. 30 tablet 11   • ibuprofen (ADVIL,MOTRIN) 800 MG tablet Take 1 tablet by mouth Every 6 (Six) Hours As Needed for Mild Pain (1-3). 120 tablet 3   • lisinopril (PRINIVIL,ZESTRIL) 10 MG tablet Take 1 tablet by mouth Daily. 30 tablet 11     No  "facility-administered medications prior to visit.        Patient Active Problem List   Diagnosis   • Chronic pain of both knees   • Chronic neck pain   • General medical examination   • Morbid obesity   • Encounter for screening for diabetes mellitus   • Encounter for screening for endocrine disorder   • Tobacco user   • Encounter for screening for cardiovascular disorders   • Tobacco abuse counseling   • Hyperlipidemia   • Essential hypertension   • Need for vaccination   • Primary osteoarthritis of both knees   • Arthritis of neck       Advance Care Planning:  has NO advance directive - information provided to the patient today    Identification of Risk Factors:  Risk factors include: weight .    Review of Systems    Compared to one year ago, the patient feels his physical health is the same.  Compared to one year ago, the patient feels his mental health is the same.    Objective     Physical Exam   Cardiovascular: Normal rate and regular rhythm.    Pulmonary/Chest: Effort normal and breath sounds normal.       Vitals:    08/10/17 0936   BP: 140/100   BP Location: Right arm   Patient Position: Sitting   Cuff Size: Large Adult   Pulse: 72   Resp: 20   Temp: 98.2 °F (36.8 °C)   TempSrc: Oral   SpO2: 97%   Weight: (!) 356 lb 6.4 oz (162 kg)   Height: 70\" (177.8 cm)   PainSc: 0-No pain       Body mass index is 51.14 kg/(m^2).  Discussed the patient's BMI with him. The BMI is above average; BMI management plan is completed.    Assessment/Plan   Patient Self-Management and Personalized Health Advice  The patient has been provided with information about: diet and preventive services including:   · Exercise counseling provided.    Visit Diagnoses:  No diagnosis found.    No orders of the defined types were placed in this encounter.      Outpatient Encounter Prescriptions as of 8/10/2017   Medication Sig Dispense Refill   • aspirin 81 MG tablet Take 1 tablet by mouth Daily. 30 tablet 11   • hydrochlorothiazide " (HYDRODIURIL) 25 MG tablet Take 0.5 tablets by mouth Daily. 30 tablet 11   • ibuprofen (ADVIL,MOTRIN) 800 MG tablet Take 1 tablet by mouth Every 6 (Six) Hours As Needed for Mild Pain (1-3). 120 tablet 3   • lisinopril (PRINIVIL,ZESTRIL) 10 MG tablet Take 1 tablet by mouth Daily. 30 tablet 11     No facility-administered encounter medications on file as of 8/10/2017.        Reviewed use of high risk medication in the elderly: yes  Reviewed for potential of harmful drug interactions in the elderly: yes    Follow Up:  No Follow-up on file.     Action Plan discussed.  Please see Scanned Documents    An After Visit Summary and PPPS with all of these plans were given to the patient.           This document has been electronically signed by Abdullahi Paula MD on August 10, 2017 10:19 AM

## 2021-09-20 ENCOUNTER — TRANSCRIBE ORDERS (OUTPATIENT)
Dept: PHYSICAL THERAPY | Facility: HOSPITAL | Age: 51
End: 2021-09-20

## 2021-09-20 DIAGNOSIS — M25.562 LEFT KNEE PAIN, UNSPECIFIED CHRONICITY: Primary | ICD-10-CM

## 2021-09-21 ENCOUNTER — HOSPITAL ENCOUNTER (OUTPATIENT)
Dept: PHYSICAL THERAPY | Facility: HOSPITAL | Age: 51
Setting detail: THERAPIES SERIES
Discharge: HOME | End: 2021-09-21

## 2021-09-21 DIAGNOSIS — M25.562 LEFT KNEE PAIN, UNSPECIFIED CHRONICITY: Primary | ICD-10-CM

## 2021-09-21 PROCEDURE — 97161 PT EVAL LOW COMPLEX 20 MIN: CPT | Performed by: PHYSICAL THERAPIST

## 2021-09-21 PROCEDURE — 97110 THERAPEUTIC EXERCISES: CPT | Performed by: PHYSICAL THERAPIST

## 2021-09-21 NOTE — THERAPY EVALUATION
"    Outpatient Physical Therapy Ortho Initial Evaluation  [facility]     Patient Name: Delmar Rodriges  : 1970  MRN: [id number]  Today's Date: 2021      Visit Date: 2021     Attendance: 1 visit  Subjective improvement: N/A  MD appt: 2021  Recheck due: 10/12/2021      Patient Active Problem List   Diagnosis   • Chronic pain of both knees   • Chronic neck pain   • General medical examination   • Morbid obesity (CMS/HCC)   • Encounter for screening for diabetes mellitus   • Encounter for screening for endocrine disorder   • Tobacco user   • Encounter for screening for cardiovascular disorders   • Tobacco abuse counseling   • Hyperlipidemia   • Essential hypertension   • Need for vaccination   • Primary osteoarthritis of both knees   • Arthritis of neck        Past Medical History:   Diagnosis Date   • Arthritis    • Essential hypertension 4/10/2017   • Hyperlipidemia 4/10/2017   • Low back pain    • Obesity    • Urinary tract infection    • Visual impairment     wears glasses        History reviewed. No pertinent surgical history.    Visit Dx:     ICD-10-CM ICD-9-CM   1. 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 Pt then worked as a  until -. Couldn't do his job because of his knee pain. Now on disability. C/o medial >lateral knee pain but states it hurts all over and radiates down into his lower leg. Has significant OA. MD states he needs to lose some weight before talks of surgery. Wears knee brace, which he says helps some with swelling & support. Without it, the pain is \"excruciating\".  " States his knee is stiff and won't extend very well. Feels unstable and pops a lot. Has a cane he got from his sister but doesn't know if he's using it right. Doesn't seem like it helps. Lives in single level home; 3 steps to enter, no railings - has a wall he can hold to. Has tub shower that's difficult to get in sometimes.  -KG      Patient/Caregiver Goals  Relieve pain;Improve mobility;Know what to do to help the symptoms  -KG      Occupation/sports/leisure activities  On disability; reports he's not very active due his knee pain  -KG      What clinical tests have you had for this problem?  X-ray  -KG      Results of Clinical Tests  Had L knee radiograph this month but don't have results. imaging from 2017 shows Severe osteoarthritic changes of left knee, severe loss of medial knee joint compartment height with bone opposing bone  -KG         Pain     Pain Location  Knee  -KG      Pain at Present  4  -KG      Pain at Best  4  -KG      Pain at Worst  10  -KG      Pain Frequency  Constant/continuous  -KG      Pain Description  Aching;Burning;Radiating;Sharp;Tightness  -KG      What Performance Factors Make the Current Problem(s) WORSE?  Pain with walking; weightbearing. Sitting for long periods. ADL's painful sometimes  -KG      What Performance Factors Make the Current Problem(s) BETTER?  Rest, wearing knee brace  -KG      Is your sleep disturbed?  Yes  -KG      Is medication used to assist with sleep?  Unspecified  -KG      Difficulties at work?  N/A  -KG      Difficulties with ADL's?  Independent  -KG      Difficulties with recreational activities?  N/A  -KG         Fall Risk Assessment    Any falls in the past year:  No  -KG        User Key  (r) = Recorded By, (t) = Taken By, (c) = Cosigned By    Initials Name Provider Type    KG Tori Witt PT Physical Therapist          PT Ortho     Row Name 09/21/21 1500       Subjective Comments    Subjective Comments  Refer to therapy pt hx for details  -KG        Precautions and Contraindications    Precautions/Limitations  no known precautions/limitations  -KG       Posture/Observations    Posture/Observations Comments  Poor overall postural awareness. Presents wearing L velcro knee brace with hinges. Notable decreased TKE on L with gait, static stance, & with supine positioning.  -KG       Special Tests/Palpation    Special Tests/Palpation  Knee  -KG       Knee Palpation    Knee Palpation?  Yes  -KG    Medial Joint Line  Left:;Tender  -KG    Lateral Joint Line  Left:;Tender  -KG    ITB  Left:;Tender  -KG    Quads  Left:;Tender  -KG    Medial Gastroc Head  Left:;Guarded/taut  -KG    Lateral Gastroc Head  Left:;Guarded/taut  -KG       Patellar Accessory Motions    Patellar Accessory Motions Tested?  Yes  -KG    Superior glide  Left:;Hypomobile;Left pain  -KG    Inferior glide  Left:;Hypomobile;Left pain  -KG    Medial glide  Left:;Hypomobile  -KG    Lateral glide  Left:;Hypomobile  -KG       Knee Special Tests    Anterior drawer (ACL lesion)  Bilateral:;Negative  -KG    Posterior drawer (PCL lesion)  Bilateral:;Negative  -KG       General ROM    RT Lower Ext  Rt Knee Extension/Flexion  -KG    LT Lower Ext  Lt Knee Extension/Flexion  -KG    GENERAL ROM COMMENTS  Bilateral hip and ankle AROM WFL  -KG       Right Lower Ext    Rt Knee Extension/Flexion AROM  2-120 deg  -KG       Left Lower Ext    Lt Knee Extension/Flexion AROM   deg  -KG    LT Lower Extremity Comments  Pain at end ranges  -KG       MMT (Manual Muscle Testing)    Rt Lower Ext  Rt Hip Flexion;Rt Hip ABduction;Rt Hip ADduction;Rt Knee Extension;Rt Knee Flexion  -KG    Lt Lower Ext  Lt Hip Flexion;Lt Hip ABduction;Lt Hip ADduction;Lt Knee Extension;Lt Knee Flexion  -KG       MMT Right Lower Ext    Rt Hip Flexion MMT, Gross Movement  (5/5) normal  -KG    Rt Hip ABduction MMT, Gross Movement  (4+/5) good plus  -KG    Rt Hip ADduction MMT, Gross Movement  (5/5) normal  -KG    Rt Knee Extension MMT, Gross Movement   (5/5) normal  -KG    Rt Knee Flexion MMT, Gross Movement  (5/5) normal  -KG       MMT Left Lower Ext    Lt Hip Flexion MMT, Gross Movement  (4+/5) good plus  -KG    Lt Hip ABduction MMT, Gross Movement  (4/5) good  -KG    Lt Hip ADduction MMT, Gross Movement  (4+/5) good plus  -KG    Lt Knee Extension MMT, Gross Movement  (4/5) good  -KG    Lt Knee Flexion MMT, Gross Movement  (4/5) good  -KG    Lt Lower Extremity Comments   No lag with SLR within available range of ext; fatigues easily  -KG       Sensation    Sensation WNL?  WNL  -KG    Light Touch  No apparent deficits  -KG       Girth    Girth Measured?  Right Lower Extremity;Left Lower Extremity  -KG       RLE Quick Girth (cm)    Mid patella  54 cm  -KG       LLE Quick Girth (cm)    Mid patella  52 cm  -KG       Gait/Stairs (Locomotion)    Comment (Gait/Stairs)  Ambulates with no AD entering clinic. Significant decreased stance time and TKE during stance. Step to pattern. Slow val. Did gait train wtih SPC; gait mechanics improved but still with same deficits just not as significant. Less pain reported with gait with SPC.  -KG      User Key  (r) = Recorded By, (t) = Taken By, (c) = Cosigned By    Initials Name Provider Type    KG Tori Witt, PT Physical Therapist                      Therapy Education  Education Details: HEP: see exercise flowsheet for details; gait training with SPC  Given: HEP, Symptoms/condition management, Posture/body mechanics, Pain management, Mobility training  Program: New  How Provided: Verbal, Demonstration, Written  Provided to: Patient  Level of Understanding: Teach back education performed, Verbalized, Demonstrated     PT OP Goals     Row Name 09/21/21 1500          PT Short Term Goals    STG Date to Achieve  10/11/21  -KG     STG 1  Demonstrate independence/compliance in performance of initial HEP  -KG     STG 2  Demonstrate improved L knee flex AROM to 115 deg or greater  -KG     STG 3  Demonstrate improved L knee ext  AROM to 18 deg or less  -KG     STG 4  Perform active L SLR flex 2x10 with good eccentric control, no lag or compensation  -KG     STG 5  Demonstrate improved L heel strike and stance time with ambulating throughout treatment with use of SPC  -KG        Long Term Goals    LTG Date to Achieve  11/01/21  -KG     LTG 1  Improve LEFS score to 25/80 or greater  -KG     LTG 2  Demonstrate improved L knee flex/ext MMT to 5/5  -KG     LTG 3  Demonstrate improved L hip abd MMT to 5/5  -KG     LTG 4  Demonstrate ability to ascend/descend training steps with use of SPC, step to pattern as needed  -KG     LTG 5  Tolerate 15 minutes of standing therex/stabilization activities with increased in pain </= 4/10  -KG        Time Calculation    PT Goal Re-Cert Due Date  10/11/21  -KG       User Key  (r) = Recorded By, (t) = Taken By, (c) = Cosigned By    Initials Name Provider Type    KG Tori Witt, PT Physical Therapist          PT Assessment/Plan     Row Name 09/21/21 1500          PT Assessment    Functional Limitations  Decreased safety during functional activities;Impaired gait;Limitation in home management;Limitations in community activities;Performance in leisure activities;Performance in self-care ADL  -KG     Impairments  Balance;Gait;Impaired flexibility;Joint mobility;Muscle strength;Pain;Range of motion  -KG     Assessment Comments  Pt is a 50 y.o.male presenting with chronic L knee pain due to significant OA which is limiting performance of functional mobility and daily activities. Pt quite limited in L knee ext which is especially noted with gait. Pt with poor gait kinematics without an AD; did gait train and educate on SPC use, which pt did better and was more safe with gait. Notable deficits in quad strength & stability present as well. Minimal patella mobility on L. Pt did fair with therex activities for initial HEP adminstration. Surgery pending for future date, per MD pt needs to work on weight loss. Pt will  "benefit from skilled PT at this time to help improve functional knee ROM/mobility & knee/quad strength & stability to help improve functional mobility and prepare pt for future surgery.  -KG     Please refer to paper survey for additional self-reported information  Yes  -KG     Rehab Potential  Fair Chronicity of condition, significant arthritic changes  -KG     Patient/caregiver participated in establishment of treatment plan and goals  Yes  -KG     Patient would benefit from skilled therapy intervention  Yes  -KG        PT Plan    PT Frequency  2x/week  -KG     Predicted Duration of Therapy Intervention (PT)  6 weeks  -KG     Physical Therapy Interventions (Optional Details)  gait training;manual therapy techniques;modalities;neuromuscular re-education;patient/family education;ROM (Range of Motion);strengthening;stretching  -KG     PT Plan Comments  Focus on improving knee ROM and overall mobility. Work on functional knee strengthening & stability activities. Continue working on and encouraging cane use with gait. Progress towards standing activities as tolerated.  -KG       User Key  (r) = Recorded By, (t) = Taken By, (c) = Cosigned By    Initials Name Provider Type    KG Tori Witt, PT Physical Therapist            OP Exercises     Row Name 09/21/21 1500             Precautions    Existing Precautions/Restrictions  no known precautions/restrictions  -KG         Subjective Comments    Subjective Comments  Refer to therapy pt hx for details  -KG         Subjective Pain    Able to rate subjective pain?  yes  -KG      Pre-Treatment Pain Level  4  -KG      Post-Treatment Pain Level  4  -KG         Exercise 1    Exercise Name 1  Gastroc stretch with strap  -KG      Reps 1  2  -KG      Time 1  30\" hold  -KG         Exercise 2    Exercise Name 2  Quad set with towel roll under knee  -KG      Sets 2  1  -KG      Reps 2  10  -KG      Time 2  5\" hold  -KG         Exercise 3    Exercise Name 3  Attempted SAQ but " "difficult due to limited knee ext  -KG         Exercise 4    Exercise Name 4  Semi-recumbent SLR flex  -KG      Sets 4  1  -KG      Reps 4  10  -KG         Exercise 5    Exercise Name 5  Heel prop ext stretch  -KG      Reps 5  Review for HEP  -KG         Exercise 6    Exercise Name 6  AA heels slides with strap  -KG      Sets 6  1  -KG      Reps 6  8  -KG         Exercise 7    Exercise Name 7  Seated heel slides  -KG      Reps 7  Review for HEP  -KG         Exercise 8    Exercise Name 8  LAQ to tolerance  -KG      Sets 8  1  -KG      Reps 8  10  -KG      Time 8  3-5\" hold  -KG         Exercise 9    Exercise Name 9  Gait training with SPC to improve stance time, step length, and safety  -KG        User Key  (r) = Recorded By, (t) = Taken By, (c) = Cosigned By    Initials Name Provider Type    Tori Danielle, PT Physical Therapist                        Outcome Measure Options: Lower Extremity Functional Scale (LEFS)  Lower Extremity Functional Index  Any of your usual work, housework or school activities: Quite a bit of difficulty  Your usual hobbies, recreational or sporting activities: Extreme difficulty or unable to perform activity  Getting into or out of the bath: Moderate difficulty  Walking between rooms: Moderate difficulty  Putting on your shoes or socks: A little bit of difficulty  Squatting: Extreme difficulty or unable to perform activity  Lifting an object, like a bag of groceries from the floor: Quite a bit of difficulty  Performing light activities around your home: Quite a bit of difficulty  Performing heavy activities around your home: Extreme difficulty or unable to perform activity  Getting into or out of a car: Quite a bit of difficulty  Walking 2 blocks: Extreme difficulty or unable to perform activity  Walking a mile: Extreme difficulty or unable to perform activity  Going up or down 10 stairs (about 1 flight of stairs): Extreme difficulty or unable to perform activity  Standing for 1 " hour: Extreme difficulty or unable to perform activity  Sitting for 1 hour: Quite a bit of difficulty  Running on even ground: Extreme difficulty or unable to perform activity  Running on uneven ground: Extreme difficulty or unable to perform activity  Making sharp turns while running fast: Extreme difficulty or unable to perform activity  Hopping: Extreme difficulty or unable to perform activity  Rolling over in bed: Quite a bit of difficulty  Total: 13      Time Calculation:     Start Time: 1502  Stop Time: 1545  Time Calculation (min): 43 min     Therapy Charges for Today     Code Description Service Date Service Provider Modifiers Qty    28025220735 HC PT EVAL LOW COMPLEXITY 1 9/21/2021 Tori Witt, PT GP 1    01022514288 HC PT THER PROC EA 15 MIN 9/21/2021 Tori Witt, PT GP 2          PT G-Codes  Outcome Measure Options: Lower Extremity Functional Scale (LEFS)  Total: 13         Tori Witt, PT  9/21/2021

## 2021-09-23 ENCOUNTER — HOSPITAL ENCOUNTER (OUTPATIENT)
Dept: PHYSICAL THERAPY | Facility: HOSPITAL | Age: 51
Setting detail: THERAPIES SERIES
Discharge: HOME | End: 2021-09-23

## 2021-09-23 DIAGNOSIS — M25.562 LEFT KNEE PAIN, UNSPECIFIED CHRONICITY: Primary | ICD-10-CM

## 2021-09-23 PROCEDURE — 97110 THERAPEUTIC EXERCISES: CPT

## 2021-09-23 PROCEDURE — 97530 THERAPEUTIC ACTIVITIES: CPT

## 2021-09-23 NOTE — THERAPY TREATMENT NOTE
Outpatient Physical Therapy Ortho Treatment Note  [facility]     Patient Name: Delmar Rodriges  : 1970  MRN: [id number]  Today's Date: 2021     Pt seen for 2 PT sessions  Reported Improvement:  NA %  MD Visit: 2021  Recheck Date: 10/12/2021    Therapy Diagnosis:  L knee pain       Visit Date: 2021    Visit Dx:    ICD-10-CM ICD-9-CM   1. Left knee pain, unspecified chronicity  M25.562 719.46       Patient Active Problem List   Diagnosis   • Chronic pain of both knees   • Chronic neck pain   • General medical examination   • Morbid obesity (CMS/HCC)   • Encounter for screening for diabetes mellitus   • Encounter for screening for endocrine disorder   • Tobacco user   • Encounter for screening for cardiovascular disorders   • Tobacco abuse counseling   • Hyperlipidemia   • Essential hypertension   • Need for vaccination   • Primary osteoarthritis of both knees   • Arthritis of neck        Past Medical History:   Diagnosis Date   • Arthritis    • Essential hypertension 4/10/2017   • Hyperlipidemia 4/10/2017   • Low back pain    • Obesity    • Urinary tract infection    • Visual impairment     wears glasses        No past surgical history on file.    PT Ortho     Row Name 21 1400       Subjective Comments    Subjective Comments  Pt states his knee is more sore since last PT visit due to doing the exercises.  Wearing the knee brace helps some.   -       Precautions and Contraindications    Precautions/Limitations  no known precautions/limitations  -      User Key  (r) = Recorded By, (t) = Taken By, (c) = Cosigned By    Initials Name Provider Type    Cassidy Garcia PTA Physical Therapy Assistant                      PT Assessment/Plan     Row Name 21 1500          PT Assessment    Assessment Comments  Pt very self limiting with therex .  Gait is severly antalgic with pt toe walking on L foot.  States PT told him to bring cane but he forgot it at home.  Pt resistant to  "perform Pro II LE bike activity.  Discussed proper wear of knee brace for maximum benefit.    -JW        PT Plan    PT Frequency  2x/week  -JW     PT Plan Comments  Next visit add SAQ if tolerated add SLR.    -JW       User Key  (r) = Recorded By, (t) = Taken By, (c) = Cosigned By    Initials Name Provider Type    ANANDA Wilcox CassidyEMORY meade Physical Therapy Assistant            OP Exercises     Row Name 09/23/21 1400             Subjective Comments    Subjective Comments  Pt states his knee is more sore since last PT visit due to doing the exercises.  Wearing the knee brace helps some.   -JW         Subjective Pain    Able to rate subjective pain?  yes  -JW      Pre-Treatment Pain Level  8  -JW      Post-Treatment Pain Level  8  -JW         Exercise 1    Exercise Name 1  Pro II LE bike for ROM , strength and endurance  -JW      Time 1  10 min   -JW      Additional Comments  lv 4.0  -JW         Exercise 2    Exercise Name 2  seated gastroc st with strap  -JW      Sets 2  2  -JW      Time 2  30  -JW         Exercise 3    Exercise Name 3  B HS st  -JW      Reps 3  2  -JW      Time 3  30  -JW         Exercise 4    Exercise Name 4  seated knee squeezes  -JW      Sets 4  2  -JW      Reps 4  10  -JW      Time 4  5\" hold   -JW         Exercise 5    Exercise Name 5  seated HS curls   -JW      Additional Comments  RTB  -JW         Exercise 6    Exercise Name 6  Review of HEP   -JW        User Key  (r) = Recorded By, (t) = Taken By, (c) = Cosigned By    Initials Name Provider Type    Cassidy GarciaEMORY Physical Therapy Assistant                       PT OP Goals     Row Name 09/23/21 1500          PT Short Term Goals    STG Date to Achieve  10/11/21  -JW     STG 1  Demonstrate independence/compliance in performance of initial HEP  -JW     STG 2  Demonstrate improved L knee flex AROM to 115 deg or greater  -JW     STG 3  Demonstrate improved L knee ext AROM to 18 deg or less  -     STG 4  Perform active L SLR flex 2x10 with " good eccentric control, no lag or compensation  -     STG 5  Demonstrate improved L heel strike and stance time with ambulating throughout treatment with use of SPC  -        Long Term Goals    LTG Date to Achieve  11/01/21  -     LTG 1  Improve LEFS score to 25/80 or greater  -     LTG 2  Demonstrate improved L knee flex/ext MMT to 5/5  -JW     LTG 3  Demonstrate improved L hip abd MMT to 5/5  -JW     LTG 4  Demonstrate ability to ascend/descend training steps with use of SPC, step to pattern as needed  -     LTG 5  Tolerate 15 minutes of standing therex/stabilization activities with increased in pain </= 4/10  -        Time Calculation    PT Goal Re-Cert Due Date  10/11/21  -       User Key  (r) = Recorded By, (t) = Taken By, (c) = Cosigned By    Initials Name Provider Type    Cassidy Garcia PTA Physical Therapy Assistant          Therapy Education  Education Details: HS curls, knee squeezes  Given: HEP, Symptoms/condition management, Posture/body mechanics  Program: Reinforced, Progressed  How Provided: Verbal  Provided to: Patient  Level of Understanding: Verbalized              Time Calculation:   Start Time: 1430  Stop Time: 1518  Time Calculation (min): 48 min  Therapy Charges for Today     Code Description Service Date Service Provider Modifiers Qty    94651604662 HC PT THERAPEUTIC ACT EA 15 MIN 9/23/2021 Cassidy Wilcox PTA GP 1    14869199194 HC PT THER PROC EA 15 MIN 9/23/2021 Cassidy Wilcox PTA GP 2                    Cassidy Wilcox PTA  9/23/2021

## 2021-09-27 ENCOUNTER — HOSPITAL ENCOUNTER (OUTPATIENT)
Dept: PHYSICAL THERAPY | Facility: HOSPITAL | Age: 51
Setting detail: THERAPIES SERIES
Discharge: HOME | End: 2021-09-27

## 2021-09-27 DIAGNOSIS — M25.562 LEFT KNEE PAIN, UNSPECIFIED CHRONICITY: Primary | ICD-10-CM

## 2021-09-27 PROCEDURE — 97110 THERAPEUTIC EXERCISES: CPT

## 2021-09-27 NOTE — THERAPY TREATMENT NOTE
Outpatient Physical Therapy Ortho Treatment Note  [facility]     Patient Name: Delmar Rodriges  : 1970  MRN: [id number]  Today's Date: 2021     Pt seen for 3 PT sessions  Reported Improvement:  n/a %  MD Visit: 2021  Recheck Date: 10/12/2021    Therapy Diagnosis:  L knee pain       Visit Date: 2021    Visit Dx:    ICD-10-CM ICD-9-CM   1. Left knee pain, unspecified chronicity  M25.562 719.46       Patient Active Problem List   Diagnosis   • Chronic pain of both knees   • Chronic neck pain   • General medical examination   • Morbid obesity (CMS/HCC)   • Encounter for screening for diabetes mellitus   • Encounter for screening for endocrine disorder   • Tobacco user   • Encounter for screening for cardiovascular disorders   • Tobacco abuse counseling   • Hyperlipidemia   • Essential hypertension   • Need for vaccination   • Primary osteoarthritis of both knees   • Arthritis of neck        Past Medical History:   Diagnosis Date   • Arthritis    • Essential hypertension 4/10/2017   • Hyperlipidemia 4/10/2017   • Low back pain    • Obesity    • Urinary tract infection    • Visual impairment     wears glasses        No past surgical history on file.    PT Ortho     Row Name 21 1300       Subjective Comments    Subjective Comments  Pt states the exercises make the back of his knee hurt more.  Pt reports he has done his HEP all but 2 days.  Feels like somebody punched him in the back of his knee.    -       Precautions and Contraindications    Precautions/Limitations  no known precautions/limitations  -       Subjective Pain    Able to rate subjective pain?  yes  -    Pre-Treatment Pain Level  7  -    Post-Treatment Pain Level  5  -       Posture/Observations    Posture/Observations Comments  Pt enters with SC for gait incomplete foot flat, lacking heel strike and antalgic gait.   -      User Key  (r) = Recorded By, (t) = Taken By, (c) = Cosigned By    Initials Name Provider  Type    ANANDA Cassidy Wilcox PTA Physical Therapy Assistant                      PT Assessment/Plan     Row Name 09/27/21 1400          PT Assessment    Assessment Comments  Pt vocal and hesitanct with each exercise but does perform as instructed.  Reports increased pain with heel prop.  Education provided on benefits of cryotherapy for pain relief however pt defers.  Pt struggles with SLR   -JW        PT Plan    PT Frequency  2x/week  -JW     PT Plan Comments  Next visit add heel slides and gait kinematics.   -JW       User Key  (r) = Recorded By, (t) = Taken By, (c) = Cosigned By    Initials Name Provider Type    ANANDA WilcoxCassidy PTA Physical Therapy Assistant            OP Exercises     Row Name 09/27/21 1300             Subjective Comments    Subjective Comments  Pt states the exercises make the back of his knee hurt more.  Pt reports he has done his HEP all but 2 days.  Feels like somebody punched him in the back of his knee.    -JW         Subjective Pain    Able to rate subjective pain?  yes  -JW      Pre-Treatment Pain Level  7  -JW      Post-Treatment Pain Level  5  -JW         Exercise 1    Exercise Name 1  Pro II LE bike for ROM , strength and endurance  -JW      Time 1  10 min   -JW      Additional Comments  Lv 5.0  -JW         Exercise 2    Exercise Name 2  B st Gastroc st  -JW      Cueing 2  Verbal;Demo  -JW      Sets 2  2  -JW      Reps 2  2  -JW      Time 2  30  -JW         Exercise 3    Exercise Name 3  B HS st  -JW      Cueing 3  Verbal  -JW      Reps 3  2  -JW      Time 3  30  -JW         Exercise 4    Exercise Name 4  standing mini squats  -JW      Cueing 4  Verbal;Demo  -JW      Sets 4  1  -JW      Reps 4  10  -JW         Exercise 5    Exercise Name 5  B standing HS curls  -JW      Cueing 5  Verbal;Demo  -JW      Sets 5  1  -JW      Reps 5  10  -JW         Exercise 6    Exercise Name 6  B standing hip ABD  -JW      Cueing 6  Verbal;Demo  -JW      Sets 6  1  -JW      Reps 6  10  -JW          "Exercise 7    Exercise Name 7  B Seated LAQ  -JW      Sets 7  1  -JW      Reps 7  10  -JW      Time 7  5\" hold   -JW         Exercise 8    Exercise Name 8  Heel prop LLE   -JW      Time 8  5 min   -JW         Exercise 9    Exercise Name 9  LLE SLR   -JW      Sets 9  1  -JW      Reps 9  10  -JW      Time 9  5\" hold   -JW        User Key  (r) = Recorded By, (t) = Taken By, (c) = Cosigned By    Initials Name Provider Type    Cassidy Garcia PTA Physical Therapy Assistant                       PT OP Goals     Row Name 09/27/21 1400          PT Short Term Goals    STG Date to Achieve  10/11/21  -JW     STG 1  Demonstrate independence/compliance in performance of initial HEP  -JW     STG 2  Demonstrate improved L knee flex AROM to 115 deg or greater  -JW     STG 3  Demonstrate improved L knee ext AROM to 18 deg or less  -JW     STG 4  Perform active L SLR flex 2x10 with good eccentric control, no lag or compensation  -JW     STG 5  Demonstrate improved L heel strike and stance time with ambulating throughout treatment with use of SPC  -        Long Term Goals    LTG Date to Achieve  11/01/21  -JW     LTG 1  Improve LEFS score to 25/80 or greater  -     LTG 2  Demonstrate improved L knee flex/ext MMT to 5/5  -JW     LTG 3  Demonstrate improved L hip abd MMT to 5/5  -JW     LTG 4  Demonstrate ability to ascend/descend training steps with use of SPC, step to pattern as needed  -     LTG 5  Tolerate 15 minutes of standing therex/stabilization activities with increased in pain </= 4/10  -JW        Time Calculation    PT Goal Re-Cert Due Date  10/11/21  -       User Key  (r) = Recorded By, (t) = Taken By, (c) = Cosigned By    Initials Name Provider Type    Cassidy Garcia PTA Physical Therapy Assistant          Therapy Education  Education Details: mini squats, hs curls and hip ABD  Given: HEP, Pain management  Program: Reinforced, Progressed  How Provided: Verbal  Provided to: Patient  Level of Understanding: " Verbalized, Demonstrated              Time Calculation:   Start Time: 1345  Stop Time: 1434  Time Calculation (min): 49 min  Therapy Charges for Today     Code Description Service Date Service Provider Modifiers Qty    [id number]  PT THER PROC EA 15 MIN 9/27/2021 Cassidy Wilcox, EMORY GP 3                    Cassidy Wilcox PTA  9/27/2021

## 2021-09-29 ENCOUNTER — HOSPITAL ENCOUNTER (OUTPATIENT)
Dept: PHYSICAL THERAPY | Facility: HOSPITAL | Age: 51
Setting detail: THERAPIES SERIES
Discharge: HOME | End: 2021-09-29

## 2021-09-29 DIAGNOSIS — M25.562 LEFT KNEE PAIN, UNSPECIFIED CHRONICITY: Primary | ICD-10-CM

## 2021-09-29 PROCEDURE — 97110 THERAPEUTIC EXERCISES: CPT

## 2021-09-29 NOTE — THERAPY TREATMENT NOTE
"    Outpatient Physical Therapy Ortho Treatment Note  [facility]     Patient Name: Delmar Rodriges  : 1970  MRN: [id number]  Today's Date: 2021     Pt seen for 4 PT sessions  Reported Improvement:  na %  MD Visit: 2021  Recheck Date: 10/12/2021    Therapy Diagnosis:  L knee pain         Visit Date: 2021    Visit Dx:    ICD-10-CM ICD-9-CM   1. Left knee pain, unspecified chronicity  M25.562 719.46       Patient Active Problem List   Diagnosis   • Chronic pain of both knees   • Chronic neck pain   • General medical examination   • Morbid obesity (CMS/HCC)   • Encounter for screening for diabetes mellitus   • Encounter for screening for endocrine disorder   • Tobacco user   • Encounter for screening for cardiovascular disorders   • Tobacco abuse counseling   • Hyperlipidemia   • Essential hypertension   • Need for vaccination   • Primary osteoarthritis of both knees   • Arthritis of neck        Past Medical History:   Diagnosis Date   • Arthritis    • Essential hypertension 4/10/2017   • Hyperlipidemia 4/10/2017   • Low back pain    • Obesity    • Urinary tract infection    • Visual impairment     wears glasses        No past surgical history on file.    PT Ortho     Row Name 21 1300       Subjective Comments    Subjective Comments  Pt states his knee is feeling better.  He feels more pain when he lets pressure off his left leg.  States he has been \"stretching the hell out of his leg\" at home.   -JW       Precautions and Contraindications    Precautions/Limitations  no known precautions/limitations  -JW       Subjective Pain    Able to rate subjective pain?  yes  -JW    Pre-Treatment Pain Level  7  -JW    Post-Treatment Pain Level  -- Pt states it feels better - does not want to admit to pain  -JW       Posture/Observations    Posture/Observations Comments  Using SPC for AD with ambulation, lacking left heel strike but improved foot flat.    -JW       Left Lower Ext    Lt Knee " "Extension/Flexion AROM  18° - 104°  -      User Key  (r) = Recorded By, (t) = Taken By, (c) = Cosigned By    Initials Name Provider Type    Cassidy Garcia PTA Physical Therapy Assistant                      PT Assessment/Plan     Row Name 09/29/21 1400          PT Assessment    Assessment Comments  Pt with improving effort and recall of LE stretches and therex.  Pt states his knee is feeling better and he is almost able to get his foot flat when he walks.  Pt was able to verbalize HEP but has not been compliant in completing at home.  Defers ice.  States his knee feels better at end of tx session but will not rate pain stating \" i am no sissy\"   -        PT Plan    PT Frequency  2x/week  -     PT Plan Comments  Next visit HR/TR   -       User Key  (r) = Recorded By, (t) = Taken By, (c) = Cosigned By    Initials Name Provider Type    Cassidy Garcia PTA Physical Therapy Assistant            OP Exercises     Row Name 09/29/21 1300             Subjective Comments    Subjective Comments  Pt states his knee is feeling better.  He feels more pain when he lets pressure off his left leg.  States he has been \"stretching the hell out of his leg\" at home.   -JW         Subjective Pain    Able to rate subjective pain?  yes  -JW      Pre-Treatment Pain Level  7  -JW      Post-Treatment Pain Level  -- Pt states it feels better - does not want to admit to pain  -JW         Exercise 1    Exercise Name 1  Pro II LE bike for ROM , strength and endurance  -JW      Time 1  10 min   -JW      Additional Comments  Lv 6.0  -JW         Exercise 2    Exercise Name 2  SL Gastroc st  -JW      Cueing 2  Verbal  -JW      Sets 2  2  -JW      Reps 2  2  -JW      Time 2  30  -JW         Exercise 3    Exercise Name 3  B HS st  -JW      Cueing 3  Verbal  -JW      Reps 3  2  -JW      Time 3  30  -JW         Exercise 4    Exercise Name 4  LLE heel prop  -JW      Cueing 4  Verbal  -JW      Time 4  5 min   -JW      Additional Comments  3# " "overpressure   -         Exercise 5    Exercise Name 5  AA Heel slides  -JW      Time 5  5 min 5\" hold at end range   -         Exercise 6    Exercise Name 6  ROM measurement   -        User Key  (r) = Recorded By, (t) = Taken By, (c) = Cosigned By    Initials Name Provider Type    Cassidy Garcia PTA Physical Therapy Assistant                       PT OP Goals     Row Name 09/29/21 1400          PT Short Term Goals    STG Date to Achieve  10/11/21  -     STG 1  Demonstrate independence/compliance in performance of initial HEP  -     STG 1 Progress  Ongoing  -     STG 2  Demonstrate improved L knee flex AROM to 115 deg or greater  -     STG 3  Demonstrate improved L knee ext AROM to 18 deg or less  -     STG 4  Perform active L SLR flex 2x10 with good eccentric control, no lag or compensation  -     STG 5  Demonstrate improved L heel strike and stance time with ambulating throughout treatment with use of SPC  -        Long Term Goals    LTG Date to Achieve  11/01/21  -     LTG 1  Improve LEFS score to 25/80 or greater  -     LTG 2  Demonstrate improved L knee flex/ext MMT to 5/5  -JW     LTG 3  Demonstrate improved L hip abd MMT to 5/5  -JW     LTG 4  Demonstrate ability to ascend/descend training steps with use of SPC, step to pattern as needed  -     LTG 5  Tolerate 15 minutes of standing therex/stabilization activities with increased in pain </= 4/10  -        Time Calculation    PT Goal Re-Cert Due Date  10/11/21  -       User Key  (r) = Recorded By, (t) = Taken By, (c) = Cosigned By    Initials Name Provider Type    Cassidy Garcia PTA Physical Therapy Assistant          Therapy Education  Education Details: SLR,   Given: HEP, Symptoms/condition management  Program: Reinforced  How Provided: Verbal  Provided to: Patient  Level of Understanding: Verbalized              Time Calculation:   Start Time: 1346  Stop Time: 1435  Time Calculation (min): 49 min  Therapy Charges for " Today     Code Description Service Date Service Provider Modifiers Qty    36184242832 HC PT THER PROC EA 15 MIN 9/29/2021 Cassidy Wilcox, EMORY GP 3                    Cassidy Wilcox PTA  9/29/2021

## 2021-10-04 ENCOUNTER — HOSPITAL ENCOUNTER (OUTPATIENT)
Dept: PHYSICAL THERAPY | Facility: HOSPITAL | Age: 51
Setting detail: THERAPIES SERIES
Discharge: HOME | End: 2021-10-04

## 2021-10-04 DIAGNOSIS — M25.562 LEFT KNEE PAIN, UNSPECIFIED CHRONICITY: Primary | ICD-10-CM

## 2021-10-04 PROCEDURE — 97110 THERAPEUTIC EXERCISES: CPT

## 2021-10-04 NOTE — THERAPY TREATMENT NOTE
Outpatient Physical Therapy Ortho Treatment Note  [facility]     Patient Name: Delmar Rodriges  : 1970  MRN: [id number]  Today's Date: 10/4/2021     Pt seen for 5 PT sessions  Reported Improvement:  Not rated %  MD Visit: 2021  Recheck Date: 10/12/2021    Therapy Diagnosis:  L knee pain         Visit Date: 10/04/2021    Visit Dx:    ICD-10-CM ICD-9-CM   1. Left knee pain, unspecified chronicity  M25.562 719.46       Patient Active Problem List   Diagnosis   • Chronic pain of both knees   • Chronic neck pain   • General medical examination   • Morbid obesity (HCC)   • Encounter for screening for diabetes mellitus   • Encounter for screening for endocrine disorder   • Tobacco user   • Encounter for screening for cardiovascular disorders   • Tobacco abuse counseling   • Hyperlipidemia   • Essential hypertension   • Need for vaccination   • Primary osteoarthritis of both knees   • Arthritis of neck        Past Medical History:   Diagnosis Date   • Arthritis    • Essential hypertension 4/10/2017   • Hyperlipidemia 4/10/2017   • Low back pain    • Obesity    • Urinary tract infection    • Visual impairment     wears glasses        No past surgical history on file.    PT Ortho     Row Name 10/04/21 1400       Subjective Comments    Subjective Comments  Pt states his knee is hurting bad and has been burning.  Was helping a friend squegee a shop floor.   -       Precautions and Contraindications    Precautions/Limitations  no known precautions/limitations  -       Posture/Observations    Posture/Observations Comments  Pt using SPC for ambulation with knee brace over L knee.   -      User Key  (r) = Recorded By, (t) = Taken By, (c) = Cosigned By    Initials Name Provider Type    Cassidy Garcia PTA Physical Therapy Assistant                      PT Assessment/Plan     Row Name 10/04/21 1500          PT Assessment    Assessment Comments  Pt continues to have reports of significant pain in L knee.   Fair compliance with current HEP with pt education on importance.    -JW        PT Plan    PT Frequency  2x/week  -JW     PT Plan Comments  Next visit add p bar gait kinematics if tolerated.    -JW       User Key  (r) = Recorded By, (t) = Taken By, (c) = Cosigned By    Initials Name Provider Type    Cassidy Garcia PTA Physical Therapy Assistant            OP Exercises     Row Name 10/04/21 1400             Subjective Comments    Subjective Comments  Pt states his knee is hurting bad and has been burning.  Was helping a friend squegee a shop floor.   -JW         Subjective Pain    Able to rate subjective pain?  yes  -JW      Pre-Treatment Pain Level  5 Pt unable to rate his pain  - states there is no number   -JW      Post-Treatment Pain Level  7  -JW         Exercise 1    Exercise Name 1  Pro II LE bike for ROM , strength and endurance  -JW      Time 1  10 min   -JW      Additional Comments  L 6.0  -JW         Exercise 2    Exercise Name 2  SL Gastroc st  -JW      Cueing 2  Verbal  -JW      Sets 2  2  -JW      Reps 2  2  -JW      Time 2  30  -JW         Exercise 3    Exercise Name 3  B HS st  -JW      Cueing 3  Verbal  -JW      Reps 3  2  -JW      Time 3  30  -JW         Exercise 4    Exercise Name 4  HS curls  -JW      Sets 4  1  -JW      Reps 4  10  -JW         Exercise 5    Exercise Name 5  standing HR/TR  -JW      Sets 5  1  -JW      Reps 5  10  -JW         Exercise 6    Exercise Name 6  Verbal review of hip ABD and mini squats with HEP   -JW         Exercise 7    Exercise Name 7  semi reclined SLR  -JW      Sets 7  1  -JW      Reps 7  10  -JW         Exercise 8    Exercise Name 8  Heel Prop LLE   -JW      Time 8  5 min   -JW      Additional Comments  3# overpressure   -JW         Exercise 9    Exercise Name 9  Heel slides  -JW      Cueing 9  Verbal  -JW      Sets 9  1  -JW      Time 9  5 min   -JW      Additional Comments  strap assist   -JW        User Key  (r) = Recorded By, (t) = Taken By, (c) = Cosigned  By    Initials Name Provider Type    Cassidy Garcia PTA Physical Therapy Assistant                       PT OP Goals     Row Name 10/04/21 1400          PT Short Term Goals    STG Date to Achieve  10/11/21  -     STG 1  Demonstrate independence/compliance in performance of initial HEP  -JW     STG 1 Progress  Ongoing  -JW     STG 2  Demonstrate improved L knee flex AROM to 115 deg or greater  -     STG 2 Progress  Ongoing  -JW     STG 3  Demonstrate improved L knee ext AROM to 18 deg or less  -     STG 3 Progress  Ongoing  -JW     STG 4  Perform active L SLR flex 2x10 with good eccentric control, no lag or compensation  -     STG 5  Demonstrate improved L heel strike and stance time with ambulating throughout treatment with use of SPC  -        Long Term Goals    LTG Date to Achieve  11/01/21  -     LTG 1  Improve LEFS score to 25/80 or greater  -     LTG 2  Demonstrate improved L knee flex/ext MMT to 5/5  -     LTG 3  Demonstrate improved L hip abd MMT to 5/5  -     LTG 4  Demonstrate ability to ascend/descend training steps with use of SPC, step to pattern as needed  -     LTG 5  Tolerate 15 minutes of standing therex/stabilization activities with increased in pain </= 4/10  -        Time Calculation    PT Goal Re-Cert Due Date  10/12/21  -       User Key  (r) = Recorded By, (t) = Taken By, (c) = Cosigned By    Initials Name Provider Type    Cassidy Garcia PTA Physical Therapy Assistant          Therapy Education  Education Details: HS curls, HR/TR  Given: HEP, Posture/body mechanics  Program: Reinforced  How Provided: Verbal  Provided to: Patient  Level of Understanding: Verbalized, Demonstrated              Time Calculation:   Start Time: 1430  Stop Time: 1520  Time Calculation (min): 50 min  Therapy Charges for Today     Code Description Service Date Service Provider Modifiers Qty    55419062260 HC PT THER PROC EA 15 MIN 10/4/2021 Cassidy Wilcox PTA GP 3                     Cassidy Wilcox, PTA  10/4/2021

## 2021-10-06 ENCOUNTER — APPOINTMENT (OUTPATIENT)
Dept: PHYSICAL THERAPY | Facility: HOSPITAL | Age: 51
End: 2021-10-06

## 2021-10-08 ENCOUNTER — HOSPITAL ENCOUNTER (OUTPATIENT)
Dept: PHYSICAL THERAPY | Facility: HOSPITAL | Age: 51
Setting detail: THERAPIES SERIES
Discharge: HOME | End: 2021-10-08

## 2021-10-08 DIAGNOSIS — M25.562 LEFT KNEE PAIN, UNSPECIFIED CHRONICITY: Primary | ICD-10-CM

## 2021-10-08 PROCEDURE — 97110 THERAPEUTIC EXERCISES: CPT

## 2021-10-08 PROCEDURE — 97530 THERAPEUTIC ACTIVITIES: CPT

## 2021-10-08 NOTE — THERAPY TREATMENT NOTE
Outpatient Physical Therapy Ortho Treatment Note  [facility]     Patient Name: Delmar Rodriges  : 1970  MRN: [id number]  Today's Date: 10/8/2021      Visit Date: 10/08/2021    Attendance: 6 visits  Subjective improvement: n/a  MD appt:   Recheck due: 10/12    Visit Dx:    ICD-10-CM ICD-9-CM   1. Left knee pain, unspecified chronicity  M25.562 719.46       Patient Active Problem List   Diagnosis   • Chronic pain of both knees   • Chronic neck pain   • General medical examination   • Morbid obesity (HCC)   • Encounter for screening for diabetes mellitus   • Encounter for screening for endocrine disorder   • Tobacco user   • Encounter for screening for cardiovascular disorders   • Tobacco abuse counseling   • Hyperlipidemia   • Essential hypertension   • Need for vaccination   • Primary osteoarthritis of both knees   • Arthritis of neck        Past Medical History:   Diagnosis Date   • Arthritis    • Essential hypertension 4/10/2017   • Hyperlipidemia 4/10/2017   • Low back pain    • Obesity    • Urinary tract infection    • Visual impairment     wears glasses        No past surgical history on file.    PT Ortho     Row Name 10/08/21 0800       Subjective Comments    Subjective Comments  pt states that he is hurting today. Feels that something is rubbing on the outside of his knee.  -KM       Precautions and Contraindications    Precautions/Limitations  no known precautions/limitations  -KM       Subjective Pain    Able to rate subjective pain?  yes  -KM    Pre-Treatment Pain Level  9  -KM    Post-Treatment Pain Level  9  -KM       Posture/Observations    Posture/Observations Comments  Knee brace donned. Presents with SPC  -KM      User Key  (r) = Recorded By, (t) = Taken By, (c) = Cosigned By    Initials Name Provider Type    Zeny Barillas PTA Physical Therapy Assistant                      PT Assessment/Plan     Row Name 10/08/21 0800          PT Assessment    Assessment  Comments  pt has high pain levels throughout treatment. pt relied heavily on UE for gait training in // bars.   -KM        PT Plan    PT Frequency  2x/week  -KM     PT Plan Comments  Cont working on gait kinematics. Try standing alt march or standing hip abd  -KM       User Key  (r) = Recorded By, (t) = Taken By, (c) = Cosigned By    Initials Name Provider Type    Zeny Barillas, PTA Physical Therapy Assistant            OP Exercises     Row Name 10/08/21 0800             Precautions    Existing Precautions/Restrictions  no known precautions/restrictions  -KM         Subjective Comments    Subjective Comments  pt states that he is hurting today. Feels that something is rubbing on the outside of his knee.  -KM         Subjective Pain    Able to rate subjective pain?  yes  -KM      Pre-Treatment Pain Level  9  -KM      Post-Treatment Pain Level  9  -KM         Exercise 1    Exercise Name 1  Pro II LE bike for ROM , strength and endurance  -KM      Time 1  10 min   -KM      Additional Comments  L6.0  -KM         Exercise 2    Exercise Name 2  Incline gastroc S  -KM      Reps 2  2  -KM      Time 2  30 sec hold  -KM         Exercise 3    Exercise Name 3  Standing HS S  -KM      Reps 3  2  -KM      Time 3  30 sec hold  -KM         Exercise 4    Exercise Name 4  // bars: Standing CR/TR  -KM      Sets 4  1  -KM      Reps 4  10  -KM         Exercise 5    Exercise Name 5  // bars: Standing alt HS curls  -KM      Sets 5  1  -KM      Reps 5  10  -KM         Exercise 6    Exercise Name 6  // bars: fwd/bkw walking  -KM      Reps 6  4 laps  -KM      Additional Comments  working on gait kinematics  -KM         Exercise 7    Exercise Name 7  // bars: lateral side stepping  -KM      Reps 7  4 laps  -KM         Exercise 8    Exercise Name 8  LLE QS  -KM      Sets 8  2  -KM      Reps 8  10  -KM      Time 8  5 sec hold  -KM      Additional Comments  towel roll under knee  -KM         Exercise 9    Exercise Name 9  SLR FF  -KM       Sets 9  2  -KM      Reps 9  10  -KM      Additional Comments  semi-recumbent  -KM         Exercise 10    Exercise Name 10  AA heel slides  -KM      Time 10  5 min  -KM      Additional Comments  using strap  -KM        User Key  (r) = Recorded By, (t) = Taken By, (c) = Cosigned By    Initials Name Provider Type    Zeny Barillas PTA Physical Therapy Assistant                       PT OP Goals     Row Name 10/08/21 0800          PT Short Term Goals    STG Date to Achieve  10/11/21  -KM     STG 1  Demonstrate independence/compliance in performance of initial HEP  -KM     STG 1 Progress  Ongoing  -KM     STG 2  Demonstrate improved L knee flex AROM to 115 deg or greater  -KM     STG 2 Progress  Ongoing  -KM     STG 3  Demonstrate improved L knee ext AROM to 18 deg or less  -KM     STG 3 Progress  Ongoing  -KM     STG 4  Perform active L SLR flex 2x10 with good eccentric control, no lag or compensation  -KM     STG 5  Demonstrate improved L heel strike and stance time with ambulating throughout treatment with use of SPC  -KM        Long Term Goals    LTG Date to Achieve  11/01/21  -KM     LTG 1  Improve LEFS score to 25/80 or greater  -KM     LTG 2  Demonstrate improved L knee flex/ext MMT to 5/5  -KM     LTG 3  Demonstrate improved L hip abd MMT to 5/5  -KM     LTG 4  Demonstrate ability to ascend/descend training steps with use of SPC, step to pattern as needed  -KM     LTG 5  Tolerate 15 minutes of standing therex/stabilization activities with increased in pain </= 4/10  -KM        Time Calculation    PT Goal Re-Cert Due Date  10/12/21  -KM       User Key  (r) = Recorded By, (t) = Taken By, (c) = Cosigned By    Initials Name Provider Type    Zeny Barillas PTA Physical Therapy Assistant          Therapy Education  Given: HEP, Posture/body mechanics  Program: Reinforced  How Provided: Verbal  Provided to: Patient  Level of Understanding: Verbalized, Demonstrated              Time  Calculation:   Start Time: 0830  Stop Time: 0913  Time Calculation (min): 43 min  Therapy Charges for Today     Code Description Service Date Service Provider Modifiers Qty    51998071021  PT THER PROC EA 15 MIN 10/8/2021 Zeny Leon, PTA GP 2    [id number]  PT THERAPEUTIC ACT EA 15 MIN 10/8/2021 Zeny Leon, [facility] GP 1                    Zeny Leon PTA  10/8/2021

## 2021-10-11 ENCOUNTER — APPOINTMENT (OUTPATIENT)
Dept: PHYSICAL THERAPY | Facility: HOSPITAL | Age: 51
End: 2021-10-11

## 2021-10-12 ENCOUNTER — HOSPITAL ENCOUNTER (OUTPATIENT)
Dept: PHYSICAL THERAPY | Facility: HOSPITAL | Age: 51
Setting detail: THERAPIES SERIES
Discharge: HOME | End: 2021-10-12

## 2021-10-12 DIAGNOSIS — M25.562 LEFT KNEE PAIN, UNSPECIFIED CHRONICITY: Primary | ICD-10-CM

## 2021-10-12 PROCEDURE — 97110 THERAPEUTIC EXERCISES: CPT | Performed by: PHYSICAL THERAPIST

## 2021-10-13 ENCOUNTER — HOSPITAL ENCOUNTER (OUTPATIENT)
Dept: PHYSICAL THERAPY | Facility: HOSPITAL | Age: 51
Setting detail: THERAPIES SERIES
Discharge: HOME | End: 2021-10-13

## 2021-10-13 DIAGNOSIS — M25.562 LEFT KNEE PAIN, UNSPECIFIED CHRONICITY: Primary | ICD-10-CM

## 2021-10-13 PROCEDURE — 97110 THERAPEUTIC EXERCISES: CPT

## 2021-10-13 PROCEDURE — 97116 GAIT TRAINING THERAPY: CPT

## 2021-10-13 NOTE — THERAPY TREATMENT NOTE
"    Outpatient Physical Therapy Ortho Treatment Note  [facility]     Patient Name: Delmar Rodriges  : 1970  MRN: [id number]  Today's Date: 10/13/2021      Visit Date: 10/13/2021     Patient has attended 8 visits  50% Improvement  MD Visit: no ortho follow up scheduled  Recheck Date: 2021    Therapy Diagnosis: L Knee Pain      Visit Dx:    ICD-10-CM ICD-9-CM   1. Left knee pain, unspecified chronicity  M25.562 719.46       Patient Active Problem List   Diagnosis   • Chronic pain of both knees   • Chronic neck pain   • General medical examination   • Morbid obesity (HCC)   • Encounter for screening for diabetes mellitus   • Encounter for screening for endocrine disorder   • Tobacco user   • Encounter for screening for cardiovascular disorders   • Tobacco abuse counseling   • Hyperlipidemia   • Essential hypertension   • Need for vaccination   • Primary osteoarthritis of both knees   • Arthritis of neck        Past Medical History:   Diagnosis Date   • Arthritis    • Essential hypertension 4/10/2017   • Hyperlipidemia 4/10/2017   • Low back pain    • Obesity    • Urinary tract infection    • Visual impairment     wears glasses        No past surgical history on file.     PT Ortho     Row Name 10/13/21 1300       Subjective Comments    Subjective Comments patient states that  he is having a lot of pain today. when asked to rate pain initially states \"have you ever been hit by a baseball bat?\"  -       Precautions and Contraindications    Precautions/Limitations no known precautions/limitations  -       Subjective Pain    Able to rate subjective pain? yes  -    Pre-Treatment Pain Level 8  -    Post-Treatment Pain Level 9  initially says 10/10 pain but defers ambulance for emergency care  -       Posture/Observations    Posture/Observations Comments Presents wearing left knee soft brace but is doffed for treatment. utilizing standard point cane. wearing work boots that are very apparent that are " "too big for his feet. ambulates with absent heel strike on his  -          User Key  (r) = Recorded By, (t) = Taken By, (c) = Cosigned By    Initials Name Provider Type     Heydi Mckeon PTA Physical Therapy Assistant                             PT Assessment/Plan     Row Name 10/13/21 1300          PT Assessment    Assessment Comments treatment today focused on gait kinematics to improve heel strike and toe off with gait cycle to facilitate improved terminal knee extension with gait and encourage improved knee extension ROM overall. patient needs cueing throughout for heel strike with gait consistently.  -            PT Plan    PT Frequency 2x/week  -     PT Plan Comments Cybex Leg Press next visit  -           User Key  (r) = Recorded By, (t) = Taken By, (c) = Cosigned By    Initials Name Provider Type     Heydi Mckeon PTA Physical Therapy Assistant                   OP Exercises     Row Name 10/13/21 1300             Subjective Comments    Subjective Comments patient states that  he is having a lot of pain today. when asked to rate pain initially states \"have you ever been hit by a baseball bat?\"  -              Subjective Pain    Able to rate subjective pain? yes  -      Pre-Treatment Pain Level 8  -      Post-Treatment Pain Level 9  initially says 10/10 pain but defers ambulance for emergency care  -              Exercise 1    Exercise Name 1 Pro II LE bike for ROM , strength and endurance  -      Time 1 10 min   -      Additional Comments L 9.0 (was set to L 7.0 and patient increases resistance)  -              Exercise 2    Exercise Name 2 B St. Incline Calf S  -      Reps 2 2  -      Time 2 30 sec hold  -              Exercise 3    Exercise Name 3 Pbar Heel Walking  -      Time 3 3 minutes  -              Exercise 4    Exercise Name 4 Pbar Fwd Giant Steps/Retro Walking  -      Time 4 3 minutes alternating laps  -              Exercise 5    Exercise Name 5 Pbars " Fwd with Focus on Appropriate Kinematics of Gait  -              Exercise 6    Exercise Name 6 Prone Hang  -      Time 6 5 minutes  -              Exercise 7    Exercise Name 7 LAQ alt with AROM Knee Flexion  -      Reps 7 20 each  -      Time 7 5 sec hold each position  -            User Key  (r) = Recorded By, (t) = Taken By, (c) = Cosigned By    Initials Name Provider Type    Heydi Daley, PTA Physical Therapy Assistant                              PT OP Goals     Row Name 10/13/21 1300          PT Short Term Goals    STG Date to Achieve 10/11/21  -     STG 1 Demonstrate independence/compliance in performance of initial HEP  -     STG 1 Progress Met  inconsistent  -     STG 2 Demonstrate improved L knee flex AROM to 115 deg or greater  -     STG 2 Progress Progressing  110 deg  -     STG 3 Demonstrate improved L knee ext AROM to 18 deg or less  -     STG 3 Progress Met  -     STG 4 Perform active L SLR flex 2x10 with good eccentric control, no lag or compensation  -     STG 4 Progress Partially Met  -     STG 5 Demonstrate improved L heel strike and stance time with ambulating throughout treatment with use of SPC  -     STG 5 Progress Met; Ongoing  -            Long Term Goals    LTG Date to Achieve 11/01/21  -     LTG 1 Improve LEFS score to 25/80 or greater  24/80  -     LTG 1 Progress Partially Met; Progressing  -     LTG 2 Demonstrate improved L knee flex/ext MMT to 5/5  -     LTG 2 Progress Progressing  -     LTG 3 Demonstrate improved L hip abd MMT to 5/5  -     LTG 3 Progress Partially Met; Progressing  -     LTG 4 Demonstrate ability to ascend/descend training steps with use of SPC, step to pattern as needed  -     LTG 4 Progress Progressing  -     LTG 5 Tolerate 15 minutes of standing therex/stabilization activities with increased in pain </= 4/10  -     LTG 5 Progress Progressing  -            Time Calculation    PT Goal Re-Cert Due Date  11/02/21  -           User Key  (r) = Recorded By, (t) = Taken By, (c) = Cosigned By    Initials Name Provider Type    Heydi Daley PTA Physical Therapy Assistant                Therapy Education  Education Details: prone Hang  Given: HEP, Symptoms/condition management, Pain management, Mobility training  Program: Reinforced, New  How Provided: Verbal, Demonstration, Written  Provided to: Patient  Level of Understanding: Teach back education performed, Verbalized, Demonstrated              Time Calculation:   Start Time: 1302  Stop Time: 1346  Time Calculation (min): 44 min  Total Timed Code Minutes- PT: 44 minute(s)  Therapy Charges for Today     Code Description Service Date Service Provider Modifiers Qty    97474545848 HC PT THER SUPP EA 15 MIN 10/13/2021 Heydi Mckeon PTA GP 1    68555146334 HC PT THER PROC EA 15 MIN 10/13/2021 Heydi Mckeon PTA GP 1    [id number] HC GAIT TRAINING EA 15 MIN 10/13/2021 Heydi Mckeon PTA GP 2                    Heydi Mckeon PTA  10/13/2021       This document has been electronically signed by Heydi Mckeon PTA on October 13, 2021 13:58 CDT

## 2021-10-13 NOTE — THERAPY PROGRESS REPORT/RE-CERT
Outpatient Physical Therapy Ortho Progress Note  [facility]     Patient Name: Delmar Rodriges  : 1970  MRN: [id number]  Today's Date: 10/12/2021      Visit Date: 10/12/2021     Attendance: 7 visits  Subjective improvement: 50% improvement  MD appt: No ortho follow up scheduled  Recheck due: 2021    Visit Dx:    ICD-10-CM ICD-9-CM   1. Left knee pain, unspecified chronicity  M25.562 719.46       Patient Active Problem List   Diagnosis   • Chronic pain of both knees   • Chronic neck pain   • General medical examination   • Morbid obesity (HCC)   • Encounter for screening for diabetes mellitus   • Encounter for screening for endocrine disorder   • Tobacco user   • Encounter for screening for cardiovascular disorders   • Tobacco abuse counseling   • Hyperlipidemia   • Essential hypertension   • Need for vaccination   • Primary osteoarthritis of both knees   • Arthritis of neck        Past Medical History:   Diagnosis Date   • Arthritis    • Essential hypertension 4/10/2017   • Hyperlipidemia 4/10/2017   • Low back pain    • Obesity    • Urinary tract infection    • Visual impairment     wears glasses        No past surgical history on file.     PT Ortho     Row Name 10/12/21 1500       Subjective Comments    Subjective Comments Pt states overall he does feel like his knee is getting better, reports 50% overall improvement. Still has times where he has a lot of pain his knee but does feel like he's walking better. States he does feel better after therapy but then the pain increases on his drive from here. States he doesn't have a follow up scheduled with the ortho MD about his knee.  -KG       Precautions and Contraindications    Precautions/Limitations no known precautions/limitations  -KG       Subjective Pain    Able to rate subjective pain? yes  -KG    Pre-Treatment Pain Level 7  -KG    Post-Treatment Pain Level 3  -KG       Posture/Observations    Posture/Observations Comments Presents  with L knee brace but doesn't wear it throughout treatment. Continues with decreased TKE during gait and static stance.  -KG       Knee Palpation    Medial Joint Line Left:; Tender  -KG    Lateral Joint Line Left:; Tender  -KG    Posterior Joint Line Left:; Tender  -KG    Medial Gastroc Head Left:; Guarded/taut  -KG    Lateral Gastroc Head Left:; Guarded/taut  -KG       Patellar Accessory Motions    Superior glide Left:; Hypomobile  -KG    Inferior glide Left:; Hypomobile  -KG    Medial glide Left:; Hypomobile  -KG    Lateral glide Left:; Hypomobile  -KG       Left Lower Ext    Lt Knee Extension/Flexion AROM  deg  -KG    LT Lower Extremity Comments Measured after stretching/manual  -KG       MMT Right Lower Ext    Rt Hip Flexion MMT, Gross Movement (5/5) normal  -KG    Rt Hip ABduction MMT, Gross Movement (5/5) normal  -KG    Rt Hip ADduction MMT, Gross Movement (5/5) normal  -KG    Rt Knee Extension MMT, Gross Movement (5/5) normal  -KG    Rt Knee Flexion MMT, Gross Movement (5/5) normal  -KG       MMT Left Lower Ext    Lt Hip Flexion MMT, Gross Movement (5/5) normal  -KG    Lt Hip ABduction MMT, Gross Movement (4+/5) good plus  -KG    Lt Hip ADduction MMT, Gross Movement (4+/5) good plus  -KG    Lt Knee Extension MMT, Gross Movement (4+/5) good plus  -KG    Lt Knee Flexion MMT, Gross Movement (4+/5) good plus  -KG    Lt Lower Extremity Comments  No lag with SLR within available range of ext  -KG       Sensation    Sensation WNL? WNL  -KG    Light Touch No apparent deficits  -KG       Gait/Stairs (Locomotion)    Comment (Gait/Stairs) Ambulates with SPC, antalgic gait LLE. Gait kinematics have improved, alirio with use of cane. Does still continue with decreased heel strike at initial contact. Doing better but still needs cues. Decreased L TKE and stance time, more so if not using cane. Challenged with reciprocal stepping over hurdles in // bars. Tends to compensate with swinging LLE around vs stepping over  emmanuel.  -KG          User Key  (r) = Recorded By, (t) = Taken By, (c) = Cosigned By    Initials Name Provider Type    KG Tori Witt, PT Physical Therapist                             PT Assessment/Plan     Row Name 10/12/21 1500          PT Assessment    Functional Limitations Decreased safety during functional activities; Impaired gait; Limitation in home management; Limitations in community activities; Performance in leisure activities; Performance in self-care ADL  -KG     Impairments Balance; Gait; Impaired flexibility; Joint mobility; Muscle strength; Pain; Range of motion  -KG     Assessment Comments Pt overall making steady progress as evidenced by improvement in knee ROM, strength, and functional activity tolerance. Pt has shown improvements in knee ROM but is still quite limited in overall ext. Don't expect this to improve much due to the signficant arthritic changes in his knee. Doing better with gait, alirio when using SPC, in regards to improved heel strike/toe off with gait. Increased tolerance noted to standing therex activities but still with pain. Overall strength improving but still needs work on overall stability and control. Did discuss about getting a follow up with the ortho MD now that he is actively in PT. Pt reports 50% overall improvement and HEP compliance was reinforced.  -KG     Please refer to paper survey for additional self-reported information Yes  -KG     Rehab Potential Fair  Chronicity of condition, significant arthritic changes  -KG     Patient/caregiver participated in establishment of treatment plan and goals Yes  -KG     Patient would benefit from skilled therapy intervention Yes  -KG            PT Plan    PT Frequency 2x/week  -KG     Predicted Duration of Therapy Intervention (PT) 6 visits  -KG     PT Plan Comments Did dicuss with pt about getting a follow up appt with is ortho MD. Will continue working on further knee strengthening/stability. Progress [facility] activities as  "tolerated. Could possibly try shuttle for strengthening. Continue advancing proprioception activities. Work towards indep management.  -KG           User Key  (r) = Recorded By, (t) = Taken By, (c) = Cosigned By    Initials Name Provider Type    KG Tori Witt, PT Physical Therapist                   OP Exercises     Row Name 10/12/21 1500             Subjective Comments    Subjective Comments Pt states overall he does feel like his knee is getting better, reports 50% overall improvement. Still has times where he has a lot of pain his knee but does feel like he's walking better. States he does feel better after therapy but then the pain increases on his drive from here. States he doesn't have a follow up scheduled with the ortho MD about his knee.  -KG              Subjective Pain    Able to rate subjective pain? yes  -KG      Pre-Treatment Pain Level 7  -KG      Post-Treatment Pain Level 3  -KG              Exercise 1    Exercise Name 1 Pro II LE bike for ROM , strength and endurance  -KG      Time 1 10 min   -KG      Additional Comments L 9.0 per pt request  -KG              Exercise 2    Exercise Name 2 Standing incline gastroc stretch  -KG      Reps 2 2  -KG      Time 2 30\" hold  -KG              Exercise 3    Exercise Name 3 Agusto standing HS stretch  -KG      Reps 3 2  -KG      Time 3 30\" hold  -KG              Exercise 4    Exercise Name 4 // bars: standing CR/TR  -KG      Sets 4 1  -KG      Reps 4 10  -KG              Exercise 5    Exercise Name 5 // bars: Standing alt HS curls  -KG      Sets 5 1  -KG      Reps 5 10  -KG              Exercise 6    Exercise Name 6 // bars: airex beam sidestepping  -KG      Sets 6 1  -KG      Reps 6 4 laps  -KG              Exercise 7    Exercise Name 7 // bars: Fwd/bwd walking  -KG      Reps 7 4 laps  -KG      Additional Comments working on gait kinematics  -KG              Exercise 8    Exercise Name 8 // bars Fwd step ups LLE  -KG      Cueing 8 Verbal  -KG      Sets 8 1 " " -KG      Reps 8 10  -KG      Additional Comments 4 inch step  -KG              Exercise 9    Exercise Name 9 // bars fwd reciprocal stepping over small hurdles  -KG      Cueing 9 Verbal; Demo  -KG      Reps 9 4 laps  -KG      Additional Comments 3 small hurdles  -KG              Exercise 10    Exercise Name 10 Semi-recumbent SLR flex  -KG      Sets 10 2  -KG      Reps 10 10  -KG              Exercise 11    Exercise Name 11 Heel Prop LLE  -KG      Time 11 2 min  -KG      Additional Comments 3# over pressure  -KG              Exercise 12    Exercise Name 12 LLE QS  -KG      Reps 12 1  -KG      Time 12 15  -KG      Additional Comments 5\" hold  -KG              Exercise 13    Exercise Name 13 Patella and tibiofemoral jt mobilizations for assessment  -KG            User Key  (r) = Recorded By, (t) = Taken By, (c) = Cosigned By    Initials Name Provider Type    KG Tori Witt, PT Physical Therapist                              PT OP Goals     Row Name 10/12/21 1500          PT Short Term Goals    STG Date to Achieve 10/11/21  -KG     STG 1 Demonstrate independence/compliance in performance of initial HEP  -KG     STG 1 Progress Met  inconsistent  -KG     STG 2 Demonstrate improved L knee flex AROM to 115 deg or greater  -KG     STG 2 Progress Progressing  110 deg  -KG     STG 3 Demonstrate improved L knee ext AROM to 18 deg or less  -KG     STG 3 Progress Met  -KG     STG 4 Perform active L SLR flex 2x10 with good eccentric control, no lag or compensation  -KG     STG 4 Progress Partially Met  -KG     STG 5 Demonstrate improved L heel strike and stance time with ambulating throughout treatment with use of SPC  -KG     STG 5 Progress Met; Ongoing  -KG            Long Term Goals    LTG Date to Achieve 11/01/21  -KG     LTG 1 Improve LEFS score to 25/80 or greater  24/80  -KG     LTG 1 Progress Partially Met; Progressing  -KG     LTG 2 Demonstrate improved L knee flex/ext MMT to 5/5  -KG     LTG 2 Progress Progressing "  -KG     LTG 3 Demonstrate improved L hip abd MMT to 5/5  -KG     LTG 3 Progress Partially Met; Progressing  -KG     LTG 4 Demonstrate ability to ascend/descend training steps with use of SPC, step to pattern as needed  -KG     LTG 4 Progress Progressing  -KG     LTG 5 Tolerate 15 minutes of standing therex/stabilization activities with increased in pain </= 4/10  -KG     LTG 5 Progress Progressing  -KG            Time Calculation    PT Goal Re-Cert Due Date 11/02/21  -KG           User Key  (r) = Recorded By, (t) = Taken By, (c) = Cosigned By    Initials Name Provider Type    KG Tori Witt, PT Physical Therapist                Therapy Education  Given: HEP, Symptoms/condition management, Pain management, Mobility training  Program: Reinforced  How Provided: Verbal  Provided to: Patient  Level of Understanding: Verbalized, Demonstrated    Outcome Measure Options: Lower Extremity Functional Scale (LEFS)  Lower Extremity Functional Index  Any of your usual work, housework or school activities: Moderate difficulty  Your usual hobbies, recreational or sporting activities: Extreme difficulty or unable to perform activity  Getting into or out of the bath: Moderate difficulty  Walking between rooms: Moderate difficulty  Putting on your shoes or socks: A little bit of difficulty  Squatting: Extreme difficulty or unable to perform activity  Lifting an object, like a bag of groceries from the floor: Moderate difficulty  Performing light activities around your home: A little bit of difficulty  Performing heavy activities around your home: Extreme difficulty or unable to perform activity  Getting into or out of a car: Moderate difficulty  Walking 2 blocks: Extreme difficulty or unable to perform activity  Walking a mile: Extreme difficulty or unable to perform activity  Going up or down 10 stairs (about 1 flight of stairs): Quite a bit of difficulty  Standing for 1 hour: Extreme difficulty or unable to perform  activity  Sitting for 1 hour: A little bit of difficulty  Running on even ground: Extreme difficulty or unable to perform activity  Running on uneven ground: Extreme difficulty or unable to perform activity  Making sharp turns while running fast: Extreme difficulty or unable to perform activity  Hopping: Extreme difficulty or unable to perform activity  Rolling over in bed: No difficulty  Total: 24      Time Calculation:   Start Time: 1515  Stop Time: 1605  Time Calculation (min): 50 min  Therapy Charges for Today     Code Description Service Date Service Provider Modifiers Qty    92854036482 HC PT THER PROC EA 15 MIN 10/12/2021 Tori Witt, PT GP 3          PT G-Codes  Outcome Measure Options: Lower Extremity Functional Scale (LEFS)  Total: 24         Tori Witt PT  10/13/2021

## 2021-10-21 ENCOUNTER — HOSPITAL ENCOUNTER (OUTPATIENT)
Dept: PHYSICAL THERAPY | Facility: HOSPITAL | Age: 51
Setting detail: THERAPIES SERIES
Discharge: HOME | End: 2021-10-21

## 2021-10-21 DIAGNOSIS — M25.562 LEFT KNEE PAIN, UNSPECIFIED CHRONICITY: Primary | ICD-10-CM

## 2021-10-21 PROCEDURE — 97116 GAIT TRAINING THERAPY: CPT

## 2021-10-21 PROCEDURE — 97110 THERAPEUTIC EXERCISES: CPT

## 2021-10-21 NOTE — THERAPY TREATMENT NOTE
Outpatient Physical Therapy Ortho Treatment Note  [facility]     Patient Name: Delmar Rodriges  : 1970  MRN: [id number]  Today's Date: 10/21/2021     Pt seen for 9 PT sessions  Reported Improvement:  n/a %  MD Visit: 2021  Recheck Date: 2021    Therapy Diagnosis:  L knee pain         Visit Date: 10/21/2021    Visit Dx:    ICD-10-CM ICD-9-CM   1. Left knee pain, unspecified chronicity  M25.562 719.46       Patient Active Problem List   Diagnosis   • Chronic pain of both knees   • Chronic neck pain   • General medical examination   • Morbid obesity (HCC)   • Encounter for screening for diabetes mellitus   • Encounter for screening for endocrine disorder   • Tobacco user   • Encounter for screening for cardiovascular disorders   • Tobacco abuse counseling   • Hyperlipidemia   • Essential hypertension   • Need for vaccination   • Primary osteoarthritis of both knees   • Arthritis of neck        Past Medical History:   Diagnosis Date   • Arthritis    • Essential hypertension 4/10/2017   • Hyperlipidemia 4/10/2017   • Low back pain    • Obesity    • Urinary tract infection    • Visual impairment     wears glasses        No past surgical history on file.     PT Ortho     Row Name 10/21/21 1300       Subjective Comments    Subjective Comments Pt staets he took a pain pill so the pain is not as bad.  States last night and yesterday it was terrible.  -       Precautions and Contraindications    Precautions/Limitations no known precautions/limitations  -JW       Subjective Pain    Able to rate subjective pain? yes  -    Pre-Treatment Pain Level 2  -       Posture/Observations    Posture/Observations Comments Knee brace on L knee and using SC for ambulation  -          User Key  (r) = Recorded By, (t) = Taken By, (c) = Cosigned By    Initials Name Provider Type    Cassidy Garcia PTA Physical Therapy Assistant                             PT Assessment/Plan     Row Name 10/21/21 1400           PT Assessment    Assessment Comments Verbal review of gait kinematics with pt able to verbalize proper technique and intermittent ability to demonstrate.  States his footwear limit his ability and he dislikes wearing tennis shoes.  Pt tolerates addition of shuttle with cues for full knee extension.  Pt evades question of scheduling orthopedic appointment.  Pt sees PCP and was encouraged to request ortho consult.  -JW            PT Plan    PT Frequency 2x/week  -JW     PT Plan Comments Add cybex Leg press next visit  -           User Key  (r) = Recorded By, (t) = Taken By, (c) = Cosigned By    Initials Name Provider Type    Cassidy Garcia PTA Physical Therapy Assistant                   OP Exercises     Row Name 10/21/21 1300             Subjective Comments    Subjective Comments Pt staets he took a pain pill so the pain is not as bad.  States last night and yesterday it was terrible.  -JW              Subjective Pain    Able to rate subjective pain? yes  -JW      Pre-Treatment Pain Level 2  -JW              Exercise 1    Exercise Name 1 Pro II LE bike for ROM, strength and endurance  -JW      Time 1 10 min ([place])  -JW      Additional Comments Lv 5.0  -JW              Exercise 2    Exercise Name 2 B St. Incline Calf S  -JW      Reps 2 2  -JW      Time 2 30 sec hold  -JW              Exercise 3    Exercise Name 3 B st HS st  -JW      Reps 3 2  -JW      Time 3 30  -JW              Exercise 4    Exercise Name 4 Prone Hang  -JW      Time 4 5 min  -JW      Additional Comments 2# overpressure  -JW              Exercise 5    Exercise Name 5 Seated LAQ  -JW      Cueing 5 Verbal; Demo  -JW      Reps 5 20  -JW      Time 5 5' hold  -JW              Exercise 6    Exercise Name 6 2L Shuttle  -JW      Cueing 6 Verbal  -JW      Time 6 3 min  -JW      Additional Comments 8 cords  -JW              Exercise 7    Exercise Name 7 LLE Shuttle  -JW      Time 7 5 min  -JW      Additional Comments 5 cords  -JW              Exercise  8    Exercise Name 8 review of gait kinematics  -            User Key  (r) = Recorded By, (t) = Taken By, (c) = Cosigned By    Initials Name Provider Type    Cassidy Garcia PTA Physical Therapy Assistant                              PT OP Goals     Row Name 10/21/21 1400          PT Short Term Goals    STG Date to Achieve 10/11/21  -     STG 1 Demonstrate independence/compliance in performance of initial HEP  -JW     STG 1 Progress Met  inconsistent  -     STG 2 Demonstrate improved L knee flex AROM to 115 deg or greater  -     STG 2 Progress Progressing  110 deg  -     STG 3 Demonstrate improved L knee ext AROM to 18 deg or less  -     STG 3 Progress Met  -     STG 4 Perform active L SLR flex 2x10 with good eccentric control, no lag or compensation  -     STG 4 Progress Partially Met  -     STG 5 Demonstrate improved L heel strike and stance time with ambulating throughout treatment with use of SPC  -     STG 5 Progress Met; Ongoing  -            Long Term Goals    LTG Date to Achieve 11/01/21  -     LTG 1 Improve LEFS score to 25/80 or greater  24/80  -     LTG 1 Progress Partially Met; Progressing  -     LTG 2 Demonstrate improved L knee flex/ext MMT to 5/5  -     LTG 2 Progress Progressing  -     LTG 3 Demonstrate improved L hip abd MMT to 5/5  -     LTG 3 Progress Partially Met; Progressing  -     LTG 4 Demonstrate ability to ascend/descend training steps with use of SPC, step to pattern as needed  -     LTG 4 Progress Progressing  -     LTG 5 Tolerate 15 minutes of standing therex/stabilization activities with increased in pain </= 4/10  -     LTG 5 Progress Progressing  -            Time Calculation    PT Goal Re-Cert Due Date 11/02/21  -           User Key  (r) = Recorded By, (t) = Taken By, (c) = Cosigned By    Initials Name Provider Type    Cassidy Garcia PTA Physical Therapy Assistant                Therapy Education  Given: Posture/body mechanics,  Pain management, Mobility training  Program: Reinforced  How Provided: Verbal, Demonstration  Provided to: Patient  Level of Understanding: Verbalized, Demonstrated              Time Calculation:   Start Time: 1346  Stop Time: 1439  Time Calculation (min): 53 min  Therapy Charges for Today     Code Description Service Date Service Provider Modifiers Qty    49362222452  PT THER PROC EA 15 MIN 10/21/2021 Cassidy Wilcox PTA GP 3    [id number]  GAIT TRAINING EA 15 MIN 10/21/2021 Cassidy Wilcox PTA GP 1                    Cassidy Wilcox PTA  10/21/2021

## 2021-10-26 ENCOUNTER — HOSPITAL ENCOUNTER (OUTPATIENT)
Dept: PHYSICAL THERAPY | Facility: HOSPITAL | Age: 51
Setting detail: THERAPIES SERIES
Discharge: HOME | End: 2021-10-26

## 2021-10-26 DIAGNOSIS — M25.562 LEFT KNEE PAIN, UNSPECIFIED CHRONICITY: Primary | ICD-10-CM

## 2021-10-26 PROCEDURE — 97110 THERAPEUTIC EXERCISES: CPT | Performed by: PHYSICAL THERAPIST

## 2021-10-26 NOTE — THERAPY TREATMENT NOTE
Outpatient Physical Therapy Ortho Treatment Note  [facility]/[place]     Patient Name: Delmar Rodriges  : 1970  MRN: [id number]  Today's Date: 10/26/2021      Visit Date: 10/26/2021     Pt seen for 10 PT sessions  Reported Improvement: 50 %  MD Visit: 2021  Recheck Date: 2021    Visit Dx:    ICD-10-CM ICD-9-CM   1. Left knee pain, unspecified chronicity  M25.562 719.46       Patient Active Problem List   Diagnosis   • Chronic pain of both knees   • Chronic neck pain   • General medical examination   • Morbid obesity (HCC)   • Encounter for screening for diabetes mellitus   • Encounter for screening for endocrine disorder   • Tobacco user   • Encounter for screening for cardiovascular disorders   • Tobacco abuse counseling   • Hyperlipidemia   • Essential hypertension   • Need for vaccination   • Primary osteoarthritis of both knees   • Arthritis of neck        Past Medical History:   Diagnosis Date   • Arthritis    • Essential hypertension 4/10/2017   • Hyperlipidemia 4/10/2017   • Low back pain    • Obesity    • Urinary tract infection    • Visual impairment     wears glasses        No past surgical history on file.     PT Ortho     Row Name 10/26/21 1300       Posture/Observations    Posture/Observations Comments L knee brace on entering clinic but off during therex.  -KG       Gait/Stairs (Locomotion)    Comment (Gait/Stairs) Continues to need cues for improved heel strike on L with gait  -KG          User Key  (r) = Recorded By, (t) = Taken By, (c) = Cosigned By    Initials Name Provider Type    Tori Danielle, PT Physical Therapist                             PT Assessment/Plan     Row Name 10/26/21 1300          PT Assessment    Assessment Comments Pt did well with progression to cybex leg press for strengthening. Cues for TKE with both single and double leg. Did well with sit to stands also, cues for TKE. Did give pt phone number for ortho MD office; [facility] ortho. Pt has  "seen Chip Crane prior to PT.  -KG            PT Plan    PT Frequency 2x/week  -KG     PT Plan Comments Continue cybex strengthening and sit to stands. Emphasize TKE. follow up if pt called MD office.  -KG           User Key  (r) = Recorded By, (t) = Taken By, (c) = Cosigned By    Initials Name Provider Type    KG Tori Witt, PT Physical Therapist                   OP Exercises     Row Name 10/26/21 1300             Precautions    Existing Precautions/Restrictions no known precautions/restrictions  -KG              Subjective Comments    Subjective Comments Pt states his L knee locked up on him last night and he couldn't get it to straighten out. He could bend it but not extend it. Hurting some today but better than yesterday  -KG              Subjective Pain    Able to rate subjective pain? yes  -KG      Pre-Treatment Pain Level 8  -KG      Post-Treatment Pain Level 8  -KG              Exercise 1    Exercise Name 1 Pro II LE bike for ROM, strength and endurance  -KG      Time 1 10 min ([place])  -KG      Additional Comments L 7.0  -KG              Exercise 2    Exercise Name 2 B St. Incline Calf S  -KG      Reps 2 2  -KG      Time 2 30 sec hold  -KG              Exercise 3    Exercise Name 3 B st HS st  -KG      Reps 3 2  -KG      Time 3 30  -KG              Exercise 4    Exercise Name 4 Seated LAQ  -KG      Cueing 4 Verbal  -KG      Sets 4 2  -KG      Reps 4 10  -KG      Time 4 5\" hold  -KG              Exercise 5    Exercise Name 5 Cybex LP2  -KG      Cueing 5 Verbal  -KG      Sets 5 2  -KG      Reps 5 15  -KG      Additional Comments focus on TKE; 11 plates  -KG              Exercise 6    Exercise Name 6 Cybex LP1 LLE  -KG      Cueing 6 Verbal  -KG      Sets 6 2  -KG      Reps 6 15  -KG      Additional Comments 8 plates; TKE focus  -KG              Exercise 7    Exercise Name 7 Fwd step ups at stairs  -KG      Sets 7 1  -KG      Reps 7 10 ea LE  -KG              Exercise 8    Exercise Name 8 Sit to stands " from airex in chair with TKE  -KG      Cueing 8 Verbal  -KG      Sets 8 1  -KG      Reps 8 10  -KG      Additional Comments Jose chair with airex  -KG            User Key  (r) = Recorded By, (t) = Taken By, (c) = Cosigned By    Initials Name Provider Type    LAUREN Tori Witt PT Physical Therapist                              PT OP Goals     Row Name 10/26/21 1300          PT Short Term Goals    STG Date to Achieve 10/11/21  -KG     STG 1 Demonstrate independence/compliance in performance of initial HEP  -KG     STG 1 Progress Met  inconsistent  -KG     STG 2 Demonstrate improved L knee flex AROM to 115 deg or greater  -KG     STG 2 Progress Progressing  110 deg  -KG     STG 3 Demonstrate improved L knee ext AROM to 18 deg or less  -KG     STG 3 Progress Met  -KG     STG 4 Perform active L SLR flex 2x10 with good eccentric control, no lag or compensation  -KG     STG 4 Progress Partially Met  -KG     STG 5 Demonstrate improved L heel strike and stance time with ambulating throughout treatment with use of SPC  -KG     STG 5 Progress Met; Ongoing  -KG            Long Term Goals    LTG Date to Achieve 11/01/21  -KG     LTG 1 Improve LEFS score to 25/80 or greater  24/80  -KG     LTG 1 Progress Partially Met; Progressing  -KG     LTG 2 Demonstrate improved L knee flex/ext MMT to 5/5  -KG     LTG 2 Progress Progressing  -KG     LTG 3 Demonstrate improved L hip abd MMT to 5/5  -KG     LTG 3 Progress Partially Met; Progressing  -KG     LTG 4 Demonstrate ability to ascend/descend training steps with use of SPC, step to pattern as needed  -KG     LTG 4 Progress Progressing  -KG     LTG 5 Tolerate 15 minutes of standing therex/stabilization activities with increased in pain </= 4/10  -KG     LTG 5 Progress Progressing  -KG            Time Calculation    PT Goal Re-Cert Due Date 11/02/21  -KG           User Key  (r) = Recorded By, (t) = Taken By, (c) = Cosigned By    Initials Name Provider Type    KG Tori Witt PT  Physical Therapist                Therapy Education  Given: HEP, Symptoms/condition management, Pain management, Mobility training  Program: Reinforced  How Provided: Demonstration, Verbal  Provided to: Patient  Level of Understanding: Verbalized, Demonstrated              Time Calculation:   Start Time: 1301  Stop Time: 1345  Time Calculation (min): 44 min  Therapy Charges for Today     Code Description Service Date Service Provider Modifiers Qty    67812712633 HC PT THER PROC EA 15 MIN 10/26/2021 Tori Witt, PT GP 3                    Tori Witt, PT  10/26/2021

## 2021-10-28 ENCOUNTER — HOSPITAL ENCOUNTER (OUTPATIENT)
Dept: PHYSICAL THERAPY | Facility: HOSPITAL | Age: 51
Setting detail: THERAPIES SERIES
Discharge: HOME | End: 2021-10-28

## 2021-10-28 DIAGNOSIS — M25.562 LEFT KNEE PAIN, UNSPECIFIED CHRONICITY: Primary | ICD-10-CM

## 2021-10-28 PROCEDURE — 97110 THERAPEUTIC EXERCISES: CPT

## 2021-10-28 NOTE — THERAPY TREATMENT NOTE
Outpatient Physical Therapy Ortho Treatment Note  [facility]     Patient Name: Delmar Rodriges  : 1970  MRN: [id number]  Today's Date: 10/28/2021      Visit Date: 10/28/2021     Patient has attended 11 visits  50% Improvement  MD Visit: 2021  Recheck Date: 2021    Therapy Diagnosis: L Knee Pain    Visit Dx:    ICD-10-CM ICD-9-CM   1. Left knee pain, unspecified chronicity  M25.562 719.46       Patient Active Problem List   Diagnosis   • Chronic pain of both knees   • Chronic neck pain   • General medical examination   • Morbid obesity (HCC)   • Encounter for screening for diabetes mellitus   • Encounter for screening for endocrine disorder   • Tobacco user   • Encounter for screening for cardiovascular disorders   • Tobacco abuse counseling   • Hyperlipidemia   • Essential hypertension   • Need for vaccination   • Primary osteoarthritis of both knees   • Arthritis of neck        Past Medical History:   Diagnosis Date   • Arthritis    • Essential hypertension 4/10/2017   • Hyperlipidemia 4/10/2017   • Low back pain    • Obesity    • Urinary tract infection    • Visual impairment     wears glasses        No past surgical history on file.     PT Ortho     Row Name 10/28/21 1300       Subjective Pain    Able to rate subjective pain? yes  -    Pre-Treatment Pain Level 9  -    Post-Treatment Pain Level 9  -          User Key  (r) = Recorded By, (t) = Taken By, (c) = Cosigned By    Initials Name Provider Type    Heydi Daley, PTA Physical Therapy Assistant                             PT Assessment/Plan     Row Name 10/28/21 1300          PT Assessment    Assessment Comments per verbal report patient is non compliant with HEP. needs cueing throughout for terminal knee extension and appropriate kinematics with gait as he tends to walk flat foot with absent heel strike and with leg rotated out.  -            PT Plan    PT Frequency 2x/week  -     PT Plan Comments resume cybex LP and  continue to push TKE  -           User Key  (r) = Recorded By, (t) = Taken By, (c) = Cosigned By    Initials Name Provider Type     Heydi Mckeon PTA Physical Therapy Assistant                   OP Exercises     Row Name 10/28/21 1300             Subjective Comments    Subjective Comments since the weather moved in he has been cramping in the back of his left thigh. reports that he does not do his HEP daily.  -              Subjective Pain    Able to rate subjective pain? yes  -      Pre-Treatment Pain Level 9  -      Post-Treatment Pain Level 9  -              Exercise 1    Exercise Name 1 Pro II LE bike for ROM, strength and endurance  -      Time 1 10 minutes  -      Additional Comments L 9.0  -              Exercise 2    Exercise Name 2 Prone Hang  -      Time 2 5 minutes  -      Additional Comments 5# weight  -              Exercise 3    Exercise Name 3 Prone TKE  -      Reps 3 30  -      Time 3 5 sec hold  -              Exercise 4    Exercise Name 4 Sit to/from stand with focus on ecc with sitting  -              Exercise 5    Exercise Name 5 Standing March  -      Time 5 3 minutes  -              Exercise 6    Exercise Name 6 Wall Sits  -      Reps 6 20  -              Exercise 7    Exercise Name 7 Gait with appropriate Kinematics  -              Exercise 8    [facility]  -      Time 8 4 minutes  -            User Key  (r) = Recorded By, (t) = Taken By, (c) = Cosigned By    Initials Name Provider Type     Heydi Mckeon PTA Physical Therapy Assistant                              PT OP Goals     Row Name 10/28/21 1300          PT Short Term Goals    STG Date to Achieve 10/11/21  -     STG 1 Demonstrate independence/compliance in performance of initial HEP  -     STG 1 Progress Met  inconsistent  -     STG 2 Demonstrate improved L knee flex AROM to 115 deg or greater  -     STG 2 Progress Progressing  110 deg  -     STG 3  Demonstrate improved L knee ext AROM to 18 deg or less  -     STG 3 Progress Met  -     STG 4 Perform active L SLR flex 2x10 with good eccentric control, no lag or compensation  -     STG 4 Progress Partially Met  [facility]     STG 5 Demonstrate improved L heel strike and stance time with ambulating throughout treatment with use of SPC  -     STG 5 Progress Met; Ongoing  -            Long Term Goals    LTG Date to Achieve 11/01/21  -     LTG 1 Improve LEFS score to 25/80 or greater  24/80  -     LTG 1 Progress Partially Met; Progressing  -     LTG 2 Demonstrate improved L knee flex/ext MMT to 5/5  -     LTG 2 Progress Progressing  -     LTG 3 Demonstrate improved L hip abd MMT to 5/5  -     LTG 3 Progress Partially Met; Progressing  -     LTG 4 Demonstrate ability to ascend/descend training steps with use of SPC, step to pattern as needed  -     LTG 4 Progress Progressing  -     LTG 5 Tolerate 15 minutes of standing therex/stabilization activities with increased in pain </= 4/10  -     LTG 5 Progress Progressing  -            Time Calculation    PT Goal Re-Cert Due Date 11/02/21  -           User Key  (r) = Recorded By, (t) = Taken By, (c) = Cosigned By    Initials Name Provider Type     Heydi Mckeon PTA Physical Therapy Assistant                Therapy Education  Education Details: discussed the importance of actually completing HEP as instructed  Given: HEP, Symptoms/condition management, Pain management, Mobility training  Program: Reinforced  How Provided: Verbal, Demonstration  Provided to: Patient  Level of Understanding: Verbalized              Time Calculation:   Start Time: 1343  Stop Time: 1433  Time Calculation (min): 50 min  Therapy Charges for Today     Code Description Service Date Service Provider Modifiers Qty    26817523086 HC PT THER SUPP EA 15 MIN 10/28/2021 Heydi Mckeon PTA GP 1    47635192812 HC PT THER PROC EA 15 MIN 10/28/2021 Heydi Mckeon PTA  3                     Heydi Mckeon, EMORY  10/28/2021          This document has been electronically signed by Heydi Mckeon PTA on October 28, 2021 14:38 CDT

## 2021-11-01 ENCOUNTER — TELEPHONE (OUTPATIENT)
Dept: PHYSICAL THERAPY | Facility: HOSPITAL | Age: 51
End: 2021-11-01

## 2021-11-01 DIAGNOSIS — M25.562 LEFT KNEE PAIN, UNSPECIFIED CHRONICITY: Primary | ICD-10-CM

## 2021-11-01 NOTE — TELEPHONE ENCOUNTER
Called for no show appointment. Patient reports he had to go to [facility]. patient rescheduled for 7:45am tomorrow.

## 2021-11-02 ENCOUNTER — HOSPITAL ENCOUNTER (OUTPATIENT)
Dept: PHYSICAL THERAPY | Facility: HOSPITAL | Age: 51
Setting detail: THERAPIES SERIES
Discharge: HOME | End: 2021-11-02

## 2021-11-02 DIAGNOSIS — M25.562 LEFT KNEE PAIN, UNSPECIFIED CHRONICITY: Primary | ICD-10-CM

## 2021-11-02 PROCEDURE — 97116 GAIT TRAINING THERAPY: CPT | Performed by: PHYSICAL THERAPIST

## 2021-11-02 PROCEDURE — 97110 THERAPEUTIC EXERCISES: CPT | Performed by: PHYSICAL THERAPIST

## 2021-11-02 PROCEDURE — 97530 THERAPEUTIC ACTIVITIES: CPT | Performed by: PHYSICAL THERAPIST

## 2021-11-02 NOTE — THERAPY DISCHARGE NOTE
Outpatient Physical Therapy Ortho Progress Note/Discharge Summary  [facility]     Patient Name: Delmar Rodriges  : 1970  MRN: [id number]  Today's Date: 2021      Visit Date: 2021    Patient seen for 12 PT sessions.  Patient reports 50% of improvement.  Next MD appt: 2021.  Recertification: N/A    Therapy Diagnosis: L Knee pain      Visit Dx:    ICD-10-CM ICD-9-CM   1. Left knee pain, unspecified chronicity  M25.562 719.46       Patient Active Problem List   Diagnosis   • Chronic pain of both knees   • Chronic neck pain   • General medical examination   • Morbid obesity (HCC)   • Encounter for screening for diabetes mellitus   • Encounter for screening for endocrine disorder   • Tobacco user   • Encounter for screening for cardiovascular disorders   • Tobacco abuse counseling   • Hyperlipidemia   • Essential hypertension   • Need for vaccination   • Primary osteoarthritis of both knees   • Arthritis of neck        Past Medical History:   Diagnosis Date   • Arthritis    • Essential hypertension 4/10/2017   • Hyperlipidemia 4/10/2017   • Low back pain    • Obesity    • Urinary tract infection    • Visual impairment     wears glasses        No past surgical history on file.     PT Ortho     Row Name 21 0800       Subjective Comments    Subjective Comments Patient reports that his knee has been worse since it got cold. he reports he doesn't think therapy is helping much.  -AJ       Precautions and Contraindications    Precautions/Limitations no known precautions/limitations  -AJ       Subjective Pain    Able to rate subjective pain? yes  -AJ    Pre-Treatment Pain Level 9  -AJ       Posture/Observations    Posture/Observations Comments L knee brace on entering clinic but off during therex.  -AJ       Knee Palpation    Medial Joint Line Left:; Tender  -AJ    Lateral Joint Line Left:; Tender  -AJ    Posterior Joint Line Left:; Tender  -AJ    Medial Gastroc Head Left:; Guarded/taut  -AJ     Lateral Gastroc Head Left:; Guarded/taut  -AJ       Patellar Accessory Motions    Superior glide Left:; Hypomobile  -AJ    Inferior glide Left:; Hypomobile  -AJ    Medial glide Left:; Hypomobile  -AJ    Lateral glide Left:; Hypomobile  -AJ       Right Lower Ext    Rt Knee Extension/Flexion AROM 8°-115°  -AJ       Left Lower Ext    Lt Knee Extension/Flexion AROM 20°-105°  -AJ       MMT Right Lower Ext    Rt Hip Flexion MMT, Gross Movement (5/5) normal  -AJ    Rt Hip ABduction MMT, Gross Movement (5/5) normal  -AJ    Rt Hip ADduction MMT, Gross Movement (5/5) normal  -AJ    Rt Knee Extension MMT, Gross Movement (5/5) normal  -AJ    Rt Knee Flexion MMT, Gross Movement (5/5) normal  -AJ       MMT Left Lower Ext    Lt Hip Flexion MMT, Gross Movement (5/5) normal  -AJ    Lt Hip ABduction MMT, Gross Movement (4+/5) good plus  -AJ    Lt Hip ADduction MMT, Gross Movement (5/5) normal  -AJ    Lt Knee Extension MMT, Gross Movement (5/5) normal  -AJ    Lt Knee Flexion MMT, Gross Movement (5/5) normal  -AJ    Lt Lower Extremity Comments  No lag with SLR, just extensin deficit  -AJ       Sensation    Sensation WNL? WNL  -AJ    Light Touch No apparent deficits  -AJ       Transfers    Comment (Transfers) I with all transfers, offweights L LE with sit to/from stand  -AJ       Gait/Stairs (Locomotion)    [place] Level (Gait) modified independence  -    Assistive Device (Gait) cane, straight  -AJ    Pattern (Gait) 3-point  -AJ    Deviations/Abnormal Patterns (Gait) antalgic; base of support, wide; val decreased  -AJ    Left Sided Gait Deviations weight shift ability decreased; heel strike decreased  Toe walks  -AJ    [place] Level (Stairs) modified independence  -    Assistive Device (Stairs) cane, straight  -AJ    Handrail Location (Stairs) left side (ascending); right side (descending)  -AJ    Number of Steps (Stairs) 3  -AJ    Ascending Technique (Stairs) step-to-step  -AJ    Descending Technique (Stairs)  step-to-step  -          User Key  (r) = Recorded By, (t) = Taken By, (c) = Cosigned By    Initials Name Provider Type    July Echeverria, PT DPT Physical Therapist                 Barriers to Rehab: Include significant or possible arthritic/degenerative changes that have occurred within the joint, The chronicity of this issue, The patient's obesity.    Safety Issues: None noted.            PT Assessment/Plan     Row Name 11/02/21 0800          PT Assessment    Functional Limitations Impaired gait; Limitation in home management; Performance in leisure activities  -     Impairments Balance; Gait; Impaired flexibility; Joint mobility; Muscle strength; Pain; Range of motion; Joint integrity  -     Assessment Comments Patient has plateaued in therapy. ROM not progressing and patient is reverting back to poor gait.  -AJ     Rehab Potential Poor  -     Patient/caregiver participated in establishment of treatment plan and goals Yes  -     Patient would benefit from skilled therapy intervention No  -AJ            PT Plan    PT Frequency --  N/A  -     PT Plan Comments D/C today with final HEP.  -           User Key  (r) = Recorded By, (t) = Taken By, (c) = Cosigned By    Initials Name Provider Type    July Echeverria, PT DPT Physical Therapist                     OP Exercises     Row Name 11/02/21 0800 11/02/21 0700          Subjective Comments    Subjective Comments Patient reports that his knee has been worse since it got cold. he reports he doesn't think therapy is helping much.  -AJ --            Subjective Pain    Able to rate subjective pain? yes  -AJ --     Pre-Treatment Pain Level 9  -AJ --            Exercise 1    Exercise Name 1 -- Pro II LE bike for ROM, strength and endurance  -     Time 1 -- 10 minutes  -     Additional Comments -- L 9.0  -            Exercise 2    Exercise Name 2 -- Prone Hang  -     Time 2 -- 5 minutes  -     Additional Comments -- 5# weight  -AJ       "      Exercise 3    Exercise Name 3 -- measurements  -            Exercise 4    Exercise Name 4 -- gait training on steps for joint protection  -            Exercise 5    Exercise Name 5 -- Sit to/from stand with focus on ecc with sitting  -     Sets 5 -- 2  -AJ     Reps 5 -- 10  -AJ            Exercise 6    Exercise Name 6 -- Seated LAQ  -     Reps 6 -- 20  -AJ     Time 6 -- 5\" hold  -            Exercise 7    Exercise Name 7 -- L SLR  -     Reps 7 -- 20  -AJ     Time 7 -- 5\" hold  -            Exercise 8    Exercise Name 8 -- Gait trining with SC review  -            Exercise 9    Exercise Name 9 -- Verbal review of remaining HEP exercises.  -           User Key  (r) = Recorded By, (t) = Taken By, (c) = Cosigned By    Initials Name Provider Type    July Echeverria, PT DPT Physical Therapist                                PT OP Goals     Row Name 11/02/21 0800 11/02/21 0700       PT Short Term Goals    STG Date to Achieve 10/11/21  - --    STG 1 Demonstrate independence/compliance in performance of initial HEP  -AJ --    STG 1 Progress Met  inconsistent  -AJ --    STG 2 Demonstrate improved L knee flex AROM to 115 deg or greater  - --    STG 2 Progress Progressing  110 deg  - --    STG 3 Demonstrate improved L knee ext AROM to 18 deg or less  - --    STG 3 Progress Partially Met; Ongoing  - --    STG 4 Perform active L SLR flex 2x10 with good eccentric control, no lag or compensation  - --    STG 4 Progress Partially Met  - --    STG 5 Demonstrate improved L heel strike and stance time with ambulating throughout treatment with use of SPC  - --    STG 5 Progress Partially Met; Ongoing  -AJ --       Long Term Goals    LTG Date to Achieve 11/01/21  -AJ --    LTG 1 Improve LEFS score to 25/80 or greater  24/80  -AJ --    LTG 1 Progress Not Met  -AJ --    LTG 2 Demonstrate improved L knee flex/ext MMT to 5/5  -AJ --    LTG 2 Progress Partially Met  -AJ --    LTG 2 Progress " Comments Met for flexion, not extension  - --    LTG 3 Demonstrate improved L hip abd MMT to 5/5  - --    LTG 3 Progress Met  - --    LTG 4 Demonstrate ability to ascend/descend training steps with use of SPC, step to pattern as needed  - --    LTG 4 Progress Progressing  - --    LTG 5 Tolerate 15 minutes of standing therex/stabilization activities with increased in pain </= 4/10  - --    LTG 5 Progress Not Met  - --       Time Calculation    PT Goal Re-Cert Due Date -- --  N/A  -          User Key  (r) = Recorded By, (t) = Taken By, (c) = Cosigned By    Initials Name Provider Type    July Echeverria, PT DPT Physical Therapist                Therapy Education  Given: HEP, Symptoms/condition management, Pain management  Program: Reinforced  How Provided: Verbal, Demonstration  Provided to: Patient  Level of Understanding: Verbalized    Outcome Measure Options: Lower Extremity Functional Scale (LEFS)  Lower Extremity Functional Index  Any of your usual work, housework or school activities: Moderate difficulty  Your usual hobbies, recreational or sporting activities: Extreme difficulty or unable to perform activity  Getting into or out of the bath: Moderate difficulty  Walking between rooms: Moderate difficulty  Putting on your shoes or socks: Moderate difficulty  Squatting: Extreme difficulty or unable to perform activity  Lifting an object, like a bag of groceries from the floor: Moderate difficulty  Performing light activities around your home: Moderate difficulty  Performing heavy activities around your home: Extreme difficulty or unable to perform activity  Getting into or out of a car: Moderate difficulty  Walking 2 blocks: Extreme difficulty or unable to perform activity  Walking a mile: Extreme difficulty or unable to perform activity  Going up or down 10 stairs (about 1 flight of stairs): Quite a bit of difficulty  Standing for 1 hour: Quite a bit of difficulty  Sitting for 1 hour:  Quite a bit of difficulty  Running on even ground: Extreme difficulty or unable to perform activity  Running on uneven ground: Extreme difficulty or unable to perform activity  Making sharp turns while running fast: Quite a bit of difficulty  Hopping: Extreme difficulty or unable to perform activity  Rolling over in bed: A little bit of difficulty  Total: 21      Time Calculation:   Start Time: 0747  Stop Time: 0832  Time Calculation (min): 45 min  Total Timed Code Minutes- PT: 45 minute(s)  Therapy Charges for Today     Code Description Service Date Service Provider Modifiers Qty    84269011264 HC PT THER SUPP EA 15 MIN 11/2/2021 July Orellana, PT DPT GP 1    11064870621 HC PT THERAPEUTIC ACT EA 15 MIN 11/2/2021 July Orellana, PT DPT GP 1    98154750730 HC PT THER PROC EA 15 MIN 11/2/2021 July Orellana, PT DPT GP 1    64063791116 HC GAIT TRAINING EA 15 MIN 11/2/2021 July Orellana, PT DPT GP 1          PT G-Codes  Outcome Measure Options: Lower Extremity Functional Scale (LEFS)  Total: 21     OP PT Discharge Summary  Date of Discharge: 11/02/21  Reason for Discharge: Independent, Lack of progress  Outcomes Achieved: Patient able to partially acheive established goals  Discharge Destination: Home with home program    This document has been electronically signed by July Orellana, PT DPT, CSCS on November 2, 2021 08:43 CDT

## 2021-11-04 ENCOUNTER — APPOINTMENT (OUTPATIENT)
Dept: PHYSICAL THERAPY | Facility: HOSPITAL | Age: 51
End: 2021-11-04